# Patient Record
Sex: MALE | Race: WHITE | NOT HISPANIC OR LATINO | URBAN - METROPOLITAN AREA
[De-identification: names, ages, dates, MRNs, and addresses within clinical notes are randomized per-mention and may not be internally consistent; named-entity substitution may affect disease eponyms.]

---

## 2018-01-17 ENCOUNTER — OFFICE VISIT (OUTPATIENT)
Dept: FAMILY MEDICINE | Facility: CLINIC | Age: 28
End: 2018-01-17
Payer: COMMERCIAL

## 2018-01-17 VITALS
BODY MASS INDEX: 25.89 KG/M2 | TEMPERATURE: 98.2 F | SYSTOLIC BLOOD PRESSURE: 120 MMHG | HEIGHT: 69 IN | WEIGHT: 174.8 LBS | DIASTOLIC BLOOD PRESSURE: 72 MMHG | HEART RATE: 84 BPM | OXYGEN SATURATION: 98 %

## 2018-01-17 DIAGNOSIS — Z00.00 ROUTINE GENERAL MEDICAL EXAMINATION AT A HEALTH CARE FACILITY: Primary | ICD-10-CM

## 2018-01-17 DIAGNOSIS — R19.7 DIARRHEA, UNSPECIFIED TYPE: ICD-10-CM

## 2018-01-17 DIAGNOSIS — Z72.51 UNPROTECTED SEXUAL INTERCOURSE: ICD-10-CM

## 2018-01-17 DIAGNOSIS — Z00.01 ENCOUNTER FOR ROUTINE ADULT MEDICAL EXAM WITH ABNORMAL FINDINGS: ICD-10-CM

## 2018-01-17 DIAGNOSIS — F43.23 ADJUSTMENT DISORDER WITH MIXED ANXIETY AND DEPRESSED MOOD: ICD-10-CM

## 2018-01-17 DIAGNOSIS — F14.10 COCAINE ABUSE (H): ICD-10-CM

## 2018-01-17 DIAGNOSIS — Z11.3 SCREEN FOR STD (SEXUALLY TRANSMITTED DISEASE): ICD-10-CM

## 2018-01-17 LAB
ALBUMIN SERPL-MCNC: 3.9 G/DL (ref 3.4–5)
ALP SERPL-CCNC: 59 U/L (ref 40–150)
ALT SERPL W P-5'-P-CCNC: 26 U/L (ref 0–70)
ANION GAP SERPL CALCULATED.3IONS-SCNC: 6 MMOL/L (ref 3–14)
AST SERPL W P-5'-P-CCNC: 25 U/L (ref 0–45)
BILIRUB SERPL-MCNC: 0.4 MG/DL (ref 0.2–1.3)
BUN SERPL-MCNC: 8 MG/DL (ref 7–30)
CALCIUM SERPL-MCNC: 8.8 MG/DL (ref 8.5–10.1)
CHLORIDE SERPL-SCNC: 106 MMOL/L (ref 94–109)
CO2 SERPL-SCNC: 28 MMOL/L (ref 20–32)
CREAT SERPL-MCNC: 1.08 MG/DL (ref 0.66–1.25)
GFR SERPL CREATININE-BSD FRML MDRD: 83 ML/MIN/1.7M2
GLUCOSE SERPL-MCNC: 76 MG/DL (ref 70–99)
HIV 1+2 AB+HIV1 P24 AG SERPL QL IA: NONREACTIVE
POTASSIUM SERPL-SCNC: 3.7 MMOL/L (ref 3.4–5.3)
PROT SERPL-MCNC: 7.3 G/DL (ref 6.8–8.8)
SODIUM SERPL-SCNC: 140 MMOL/L (ref 133–144)
TSH SERPL DL<=0.005 MIU/L-ACNC: 3.73 MU/L (ref 0.4–4)

## 2018-01-17 PROCEDURE — 86780 TREPONEMA PALLIDUM: CPT | Performed by: NURSE PRACTITIONER

## 2018-01-17 PROCEDURE — 87491 CHLMYD TRACH DNA AMP PROBE: CPT | Performed by: NURSE PRACTITIONER

## 2018-01-17 PROCEDURE — 87591 N.GONORRHOEAE DNA AMP PROB: CPT | Performed by: NURSE PRACTITIONER

## 2018-01-17 PROCEDURE — 80053 COMPREHEN METABOLIC PANEL: CPT | Performed by: NURSE PRACTITIONER

## 2018-01-17 PROCEDURE — 87389 HIV-1 AG W/HIV-1&-2 AB AG IA: CPT | Performed by: NURSE PRACTITIONER

## 2018-01-17 PROCEDURE — 84443 ASSAY THYROID STIM HORMONE: CPT | Performed by: NURSE PRACTITIONER

## 2018-01-17 PROCEDURE — 99385 PREV VISIT NEW AGE 18-39: CPT | Performed by: NURSE PRACTITIONER

## 2018-01-17 PROCEDURE — 99213 OFFICE O/P EST LOW 20 MIN: CPT | Mod: 25 | Performed by: NURSE PRACTITIONER

## 2018-01-17 PROCEDURE — 36415 COLL VENOUS BLD VENIPUNCTURE: CPT | Performed by: NURSE PRACTITIONER

## 2018-01-17 RX ORDER — CRANBERRY FRUIT EXTRACT 200 MG
CAPSULE ORAL
COMMUNITY
Start: 2018-01-17 | End: 2018-10-08

## 2018-01-17 RX ORDER — GABAPENTIN 100 MG/1
100 CAPSULE ORAL 2 TIMES DAILY
COMMUNITY
End: 2018-10-08

## 2018-01-17 RX ORDER — BUPROPION HYDROCHLORIDE 150 MG/1
450 TABLET, EXTENDED RELEASE ORAL DAILY
COMMUNITY
End: 2021-01-20

## 2018-01-17 NOTE — MR AVS SNAPSHOT
After Visit Summary   1/17/2018    Bib Cates    MRN: [id number]           Patient Information     Date Of Birth          1990        Visit Information        Provider Department      1/17/2018 10:20 AM Areli Ulrich APRN [facility]        Today's Diagnoses     Screen for STD (sexually transmitted disease)    -  1    Routine general medical examination at a health care facility        Encounter for routine adult medical exam with abnormal findings        Cocaine abuse        Unprotected sexual intercourse        Adjustment disorder with mixed anxiety and depressed mood        Diarrhea, unspecified type          Care Instructions    The lab results will take a couple days to get back-for sure by next week I will send them to you in [employer] or a nurse will call.     Let us know at any time if you want more help getting sober.     Preventive Health Recommendations  Male Ages 26 - 39    Yearly exam:             See your health care provider every year in order to  o   Review health changes.   o   Discuss preventive care.    o   Review your medicines if your doctor has prescribed any.    You should be tested each year for STDs (sexually transmitted diseases), if you re at risk.     After age 35, talk to your provider about cholesterol testing. If you are at risk for heart disease, have your cholesterol tested at least every 5 years.     If you are at risk for diabetes, you should have a diabetes test (fasting glucose).  Shots: Get a flu shot each year. Get a tetanus shot every 10 years.     Nutrition:    Eat at least 5 servings of fruits and vegetables daily.     Eat whole-grain bread, whole-wheat pasta and brown rice instead of white grains and rice.     Talk to your provider about Calcium and Vitamin D.     Lifestyle    Exercise for at least 150 minutes a week (30 minutes a day, 5 days a week). This will help you control your weight and prevent disease.     Limit  "alcohol to one drink per day.     No smoking.     Wear sunscreen to prevent skin cancer.     See your dentist every six months for an exam and cleaning.             Follow-ups after your visit        Who to contact     If you have questions or need follow up information about today's clinic visit or your schedule please contact [facility] directly at [phone].  Normal or non-critical lab and imaging results will be communicated to you by MyChart, letter or phone within 4 business days after the clinic has received the results. If you do not hear from us within 7 days, please contact the clinic through nvitehart or phone. If you have a critical or abnormal lab result, we will notify you by phone as soon as possible.  Submit refill requests through [employer] or call your pharmacy and they will forward the refill request to us. Please allow 3 business days for your refill to be completed.          Additional Information About Your Visit        [employer] Information     [employer] lets you send messages to your doctor, view your test results, renew your prescriptions, schedule appointments and more. To sign up, go to [email or web address] . Click on \"Log in\" on the left side of the screen, which will take you to the Welcome page. Then click on \"Sign up Now\" on the right side of the page.     You will be asked to enter the access code listed below, as well as some personal information. Please follow the directions to create your username and password.     Your access code is: [email or web address]  Expires: 2018 10:36 AM     Your access code will  in 90 days. If you need help or a new code, please call your [facility] [place] [facility] or [phone].        Care EveryWhere ID     This is your Care EveryWhere ID. This could be used by other organizations to access your [place] medical records  [id number]        Your Vitals Were     Pulse Temperature Height Pulse Oximetry BMI (Body Mass Index)       84 98.2  F (36.8 " " C) (Oral) 5' 8.5\" (1.74 m) 98% 26.19 kg/m2        Blood Pressure from Last 3 Encounters:   01/17/18 120/72    Weight from Last 3 Encounters:   01/17/18 174 lb 12.8 oz (79.3 kg)              We Performed the Following     Anti Treponema     CHLAMYDIA TRACHOMATIS PCR     Comprehensive metabolic panel     HIV Antigen Antibody Combo     NEISSERIA GONORRHOEA PCR     TSH with free T4 reflex        Primary Care Provider Fax #    Physician No Ref-Primary [phone]       No address on file        Equal Access to Services     CARMEN HARRINGTON : Hadii aad ku hadasho Soomaali, waaxda luqadaha, qaybta kaalmada adeegyatristen, tamika philip . So [facility] [phone].    ATENCIÓN: Si habla español, tiene a gregory disposición servicios gratuitos de asistencia lingüística. Llame al [phone].    We comply with applicable federal civil rights laws and [place] laws. We do not discriminate on the basis of race, color, national origin, age, disability, sex, sexual orientation, or gender identity.            Thank you!     Thank you for choosing [facility]  for your care. Our goal is always to provide you with excellent care. Hearing back from our patients is one way we can continue to improve our services. Please take a few minutes to complete the written survey that you may receive in the mail after your visit with us. Thank you!             Your Updated Medication List - Protect others around you: Learn how to safely use, store and throw away your medicines at www.disposemymeds.org.          This list is accurate as of: 1/17/18 10:36 AM.  Always use your most recent med list.                   Brand Name Dispense Instructions for use Diagnosis    ACIDOPHILUS PROBIOTIC BLEND Caps           buPROPion 150 MG 12 hr tablet    WELLBUTRIN SR     Take 150 mg by mouth 2 times daily        cholecalciferol 5000 UNITS Caps capsule    vitamin D3     Take by mouth daily        gabapentin 100 MG capsule    " NEURONTIN     Take 100 mg by mouth 2 times daily        psyllium 0.52 G capsule     540 capsule    Take 1 capsule (0.52 g) by mouth daily
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
## 2018-01-17 NOTE — PATIENT INSTRUCTIONS
The lab results will take a couple days to get back-for sure by next week I will send them to you in [employer] or a nurse will call.     Let us know at any time if you want more help getting sober.     Preventive Health Recommendations  Male Ages 26 - 39    Yearly exam:             See your health care provider every year in order to  o   Review health changes.   o   Discuss preventive care.    o   Review your medicines if your doctor has prescribed any.    You should be tested each year for STDs (sexually transmitted diseases), if you re at risk.     After age 35, talk to your provider about cholesterol testing. If you are at risk for heart disease, have your cholesterol tested at least every 5 years.     If you are at risk for diabetes, you should have a diabetes test (fasting glucose).  Shots: Get a flu shot each year. Get a tetanus shot every 10 years.     Nutrition:    Eat at least 5 servings of fruits and vegetables daily.     Eat whole-grain bread, whole-wheat pasta and brown rice instead of white grains and rice.     Talk to your provider about Calcium and Vitamin D.     Lifestyle    Exercise for at least 150 minutes a week (30 minutes a day, 5 days a week). This will help you control your weight and prevent disease.     Limit alcohol to one drink per day.     No smoking.     Wear sunscreen to prevent skin cancer.     See your dentist every six months for an exam and cleaning.

## 2018-01-17 NOTE — NURSING NOTE
"Chief Complaint   Patient presents with     Establish Care     Physical     STD       Initial /72 (BP Location: Right arm, Patient Position: Chair, Cuff Size: Adult Regular)  Pulse 84  Temp 98.2  F (36.8  C) (Oral)  Ht 5' 8.5\" (1.74 m)  Wt 174 lb 12.8 oz (79.3 kg)  SpO2 98%  BMI 26.19 kg/m2 Estimated body mass index is 26.19 kg/(m^2) as calculated from the following:    Height as of this encounter: 5' 8.5\" (1.74 m).    Weight as of this encounter: 174 lb 12.8 oz (79.3 kg).  Medication Reconciliation: complete     Shira Infante CMA    "

## 2018-01-17 NOTE — LETTER
January 22, 2018      Bib Alberton  [address] [facility] [address]        Dear ,    We are writing to inform you of your test results.    Your test results fall within the expected range(s) or remain unchanged from previous results.  Please continue with current treatment plan.    Resulted Orders   Comprehensive metabolic panel   Result Value Ref Range    Sodium 140 133 - 144 mmol/L    Potassium 3.7 3.4 - 5.3 mmol/L    Chloride 106 94 - 109 mmol/L    Carbon Dioxide 28 20 - 32 mmol/L    Anion Gap 6 3 - 14 mmol/L    Glucose 76 70 - 99 mg/dL    Urea Nitrogen 8 7 - 30 mg/dL    Creatinine 1.08 0.66 - 1.25 mg/dL    GFR Estimate 83 >60 mL/min/1.7m2      Comment:      Non  GFR Calc    GFR Estimate If Black >90 >60 mL/min/1.7m2      Comment:       GFR Calc    Calcium 8.8 8.5 - 10.1 mg/dL    Bilirubin Total 0.4 0.2 - 1.3 mg/dL    Albumin 3.9 3.4 - 5.0 g/dL    Protein Total 7.3 6.8 - 8.8 g/dL    Alkaline Phosphatase 59 40 - 150 U/L    ALT 26 0 - 70 U/L    AST 25 0 - 45 U/L   TSH with free T4 reflex   Result Value Ref Range    TSH 3.73 0.40 - 4.00 mU/L   Anti Treponema   Result Value Ref Range    Treponema pallidum Antibody Negative NEG^Negative   HIV Antigen Antibody Combo   Result Value Ref Range    HIV Antigen Antibody Combo Nonreactive NR^Nonreactive          Comment:      HIV-1 p24 Ag & HIV-1/HIV-2 Ab Not Detected       If you have any questions or concerns, please call the clinic at the number listed above.       Sincerely,        CASI Constantino CNP

## 2018-01-18 LAB
C TRACH DNA SPEC QL NAA+PROBE: NEGATIVE
N GONORRHOEA DNA SPEC QL NAA+PROBE: NEGATIVE
SPECIMEN SOURCE: NORMAL
SPECIMEN SOURCE: NORMAL
T PALLIDUM IGG+IGM SER QL: NEGATIVE

## 2018-10-08 ENCOUNTER — OFFICE VISIT (OUTPATIENT)
Dept: FAMILY MEDICINE | Facility: CLINIC | Age: 28
End: 2018-10-08
Payer: COMMERCIAL

## 2018-10-08 VITALS
TEMPERATURE: 98.1 F | SYSTOLIC BLOOD PRESSURE: 112 MMHG | HEART RATE: 71 BPM | OXYGEN SATURATION: 100 % | DIASTOLIC BLOOD PRESSURE: 78 MMHG

## 2018-10-08 DIAGNOSIS — Z13.220 LIPID SCREENING: ICD-10-CM

## 2018-10-08 DIAGNOSIS — Z23 NEED FOR PROPHYLACTIC VACCINATION AND INOCULATION AGAINST INFLUENZA: ICD-10-CM

## 2018-10-08 DIAGNOSIS — Z13.1 SCREENING FOR DIABETES MELLITUS: ICD-10-CM

## 2018-10-08 DIAGNOSIS — Z11.3 SCREEN FOR STD (SEXUALLY TRANSMITTED DISEASE): Primary | ICD-10-CM

## 2018-10-08 DIAGNOSIS — Z23 NEED FOR INFLUENZA VACCINATION: ICD-10-CM

## 2018-10-08 DIAGNOSIS — G47.8 WAKES UP DURING NIGHT: ICD-10-CM

## 2018-10-08 DIAGNOSIS — R06.83 SNORING: ICD-10-CM

## 2018-10-08 LAB
CHOLEST SERPL-MCNC: 172 MG/DL
GLUCOSE SERPL-MCNC: 81 MG/DL (ref 70–99)
HDLC SERPL-MCNC: 55 MG/DL
LDLC SERPL CALC-MCNC: 103 MG/DL
NONHDLC SERPL-MCNC: 117 MG/DL
TRIGL SERPL-MCNC: 72 MG/DL

## 2018-10-08 PROCEDURE — 86780 TREPONEMA PALLIDUM: CPT | Performed by: NURSE PRACTITIONER

## 2018-10-08 PROCEDURE — 36415 COLL VENOUS BLD VENIPUNCTURE: CPT | Performed by: NURSE PRACTITIONER

## 2018-10-08 PROCEDURE — 87389 HIV-1 AG W/HIV-1&-2 AB AG IA: CPT | Performed by: NURSE PRACTITIONER

## 2018-10-08 PROCEDURE — 90471 IMMUNIZATION ADMIN: CPT | Performed by: NURSE PRACTITIONER

## 2018-10-08 PROCEDURE — 99213 OFFICE O/P EST LOW 20 MIN: CPT | Mod: 25 | Performed by: NURSE PRACTITIONER

## 2018-10-08 PROCEDURE — 87591 N.GONORRHOEAE DNA AMP PROB: CPT | Performed by: NURSE PRACTITIONER

## 2018-10-08 PROCEDURE — 90686 IIV4 VACC NO PRSV 0.5 ML IM: CPT | Performed by: NURSE PRACTITIONER

## 2018-10-08 PROCEDURE — 87491 CHLMYD TRACH DNA AMP PROBE: CPT | Performed by: NURSE PRACTITIONER

## 2018-10-08 PROCEDURE — 80061 LIPID PANEL: CPT | Performed by: NURSE PRACTITIONER

## 2018-10-08 PROCEDURE — 82947 ASSAY GLUCOSE BLOOD QUANT: CPT | Performed by: NURSE PRACTITIONER

## 2018-10-08 RX ORDER — TRAZODONE HYDROCHLORIDE 50 MG/1
50 TABLET, FILM COATED ORAL AT BEDTIME
Refills: 2 | COMMUNITY
Start: 2018-09-25 | End: 2019-07-17

## 2018-10-08 NOTE — PROGRESS NOTES
"  SUBJECTIVE:   Bib Eugene is a 27 year old male who presents to clinic today for the following health issues:    Would like STD screening, new recent partner.     Also referral for a sleep study.  Waking up frequently and snoring, his therapist wants him to get antoerh one  Reports 6 years ago had one at [facility] and didn't show anything  recently started trazodone and so far not helping with this  Doesn't wake up worrying but does have anxiety and depression, past substance abuse and followed by outside Psych [facility], takes buproprion  Taking vit D  No asthma or breathing issues, no new symptoms       Name:  BIB EUGENE  Study Date: 2/27/2014  YOB: 1990    Polysomnography Report    Indications for Polysomnogram:  The patient is a 23 year old male who stands 5'9\", weighs 179 lbs., and has a corresponding BMI equal to 26.4.  He has   an Hammond sleepiness scale value of  16 .  Neck circumference was measured at 18 inches.   A full nightpolysomnogram (PSG) was performed to evaluate   for Obstructive Sleep Apnea (MELLY) in this patient who has poor quality non-restorative sleep with subsequent daytime sleepiness.   He has a past   medical history which includes:  2+  tonsils.  Snore andchoking arousals.  According to the patient,  he may be having his tonsils removed.    Referring Physician: Saira Joseph RN  CNP    Medications: None at this time    Polysomnogram Data:  A full night attended polysomnogram recorded the standard physiologic parameters including EEG, EOG, EMG, EKG, nasal and oral   airflow.  Respiratory parameters of chest and abdominal movements were recorded with respiratory inductanceplethysmography.  Oxygen saturation was   recorded by pulse oximetry.    Sleep Architecture:  Sleep recording started at 23:19:21 and the recording ended at 07:44:52.  The total recording time of the polysomnogram was 505.5   minutes.  The total sleep time was 489.0 " minutes on the diagnostic portion of the study.  The overalltotal sleep time was 489.0 minutes.  Sleep   latency was normal at 0.0 minutes.  REM latency was normal at 136.5 minutes.  There were 117 arousals on the diagnostic portion of the study with an   arousal index of 13.6.  Sleep efficiency was normal at 96.7 %.Wake after sleep onset was 16.5.  The patient spent 9.1% of total sleep time in Stage   N1, 48.2% in Stage N2, 20.0% in Stage N3, and 22.7% in REM.       Respiration:  -Snoring was reported as present and noted to be intermittently loud in intensity.  -Sleep Associated Hypoxemia was not present and based upon cumulative exposure of SaO2 below 88% for more than 5 minutes of total sleep time during   the diagnostic portion of the PSG.  Baseline oxygen saturation during wake was 96%.  Lowest oxygensaturation during sleep was 89%.    -Sustained Sleep Associated Hypoventilation was not  assessed with Transcutaneous Monitoring (TCM) of CO2 during this PSG.    -Events  obstructive, 0 central, and 0 mixed apneas resulting in an Apnea index of 0.0 events per hour.  There were 23 hypopneas resulting in a   Hypopnea index of 2.8 events per hour.  The combined Apnea/Hypopnea -Index was 2.8 events per hour.  The REMAHI is 1.1.  The supine AHI is 4.7.  The   RERA index was 6.1 per hour.  The RDI was 9.0.    Movement Activity:  -Limb Notes ecorded.  The PLM index was 0.0 per hour.   -Behavior with normal muscle atonia.  -Bruxism     Cardiac Summary:  The average pulse rate was 63 bpm.  The minimal pulse rate was 52 bpm while the maximum pulse rate was 73 bpm.  The following   dysrhythmias were noted:  None       Home Study: Actigraphy / CPAP Download   There are no home diagnostic studies that are associated with this in-laboratory PSG.    Treatment:    Given that the patient did not exhibit clinically significant MELLY, therapeutic intervention with Positive Airway Pressure in a mode such as CPAP was   not  initiated.    Assessment  -Normal sleep architecture  -Mild sleep apnea, Upper Airway Resistance Syndrome  -Sleep-related hypoxemia and/or hypoventilation (>5 min SpO2 < 88%)  was not present    Recommendations:  -Advise regarding the risks of drowsy driving   -Suggest optimizing sleep hygiene and avoiding sleep deprivation  -Mandibular advancement device could be considered in this setting with a referral to a Sleep Dental Specialist.  Note: A Mandibular advancement   device may not be covered by all primary medical insurance plans.  In this situation, the patient shouldcheck with their insurance provider to   determine the extent of coverage.  -[facility] Staff will contact the patient within 10 days upon the completion of the PSG to discuss the results of the study and to provide   appropriate therapeutic options.      Diagnosis Code(s): 327.23  Obstructive Sleep Apnea      ________________________________  Electronically Signed By:    Dr. Arlene Cohen MD  (NPI#: [id number])  Diplomate of Sleep Medicine, ABPN        Problem list and histories reviewed & adjusted, as indicated.  Additional history: as documented    Patient Active Problem List   Diagnosis     Diarrhea, unspecified type     Adjustment disorder with mixed anxiety and depressed mood     Unprotected sexual intercourse     Cocaine abuse (H)     Past Surgical History:   Procedure Laterality Date     HC TOOTH EXTRACTION W/FORCEP  12/28/2010    [place] teeth       Social History   Substance Use Topics     Smoking status: Never Smoker     Smokeless tobacco: Never Used     Alcohol use Yes      Comment: 5/week     Family History   Problem Relation Age of Onset     Substance Abuse Mother      Anxiety Disorder Mother      Depression Mother      Mental Illness Mother      Depression Father      Anxiety Disorder Father          Current Outpatient Prescriptions   Medication Sig Dispense Refill     buPROPion (WELLBUTRIN SR) 150 MG 12 hr tablet Take 150 mg by mouth  2 times daily       cholecalciferol (VITAMIN D3) 5000 UNITS CAPS capsule Take by mouth daily       psyllium 0.52 G capsule Take 1 capsule (0.52 g) by mouth daily 540 capsule      traZODone (DESYREL) 50 MG tablet Take 50 mg by mouth At Bedtime  2     No Known Allergies  Recent Labs   Lab Test  01/17/18   1039   ALT  26   CR  1.08   GFRESTIMATED  83   GFRESTBLACK  >90   POTASSIUM  3.7   TSH  3.73      BP Readings from Last 3 Encounters:   10/08/18 112/78   01/17/18 120/72    Wt Readings from Last 3 Encounters:   01/17/18 174 lb 12.8 oz (79.3 kg)                  Labs reviewed in EPIC    Reviewed and updated as needed this visit by clinical staff  Tobacco  Allergies  Meds  Med Hx  Surg Hx  Fam Hx  Soc Hx      Reviewed and updated as needed this visit by Provider         ROS:  Constitutional, HEENT, cardiovascular, pulmonary, GI, , musculoskeletal, neuro, skin, endocrine and psych systems are negative, except as otherwise noted.    OBJECTIVE:     /78 (BP Location: Right arm, Patient Position: Sitting, Cuff Size: Adult Regular)  Pulse 71  Temp 98.1  F (36.7  C) (Temporal)  SpO2 100%  There is no height or weight on file to calculate BMI.  GENERAL: healthy, alert and no distress  RESP: lungs clear to auscultation - no rales, rhonchi or wheezes  CV: regular rate and rhythm, normal S1 S2, no S3 or S4, no murmur, click or rub, no peripheral edema and peripheral pulses strong  MS: no gross musculoskeletal defects noted, no edema  SKIN: no suspicious lesions or rashes  NEURO: Normal strength and tone, mentation intact and speech normal  PSYCH: MENTAL STATUS EXAM  Appearance: appropriate  Attitude: cooperative  Behavior: normal  Eye Contact: normal  Speech: normal  Orientation: oriented to person , place, time and situation  Mood: states his mood has been stable   Affect: Normal/bright, anxious and pleasant   Thought Process: clear     Diagnostic Test Results:  No results found for this or any previous visit  (from the past 24 hour(s)).    ASSESSMENT/PLAN:         ICD-10-CM    1. Screen for STD (sexually transmitted disease) Z11.3 NEISSERIA GONORRHOEA PCR     CHLAMYDIA TRACHOMATIS PCR     HIV Antigen Antibody Combo     Treponema Abs w Reflex to RPR and Titer   2. Snoring R06.83 SLEEP EVALUATION & MANAGEMENT REFERRAL - [facility]  [phone] (Age 2 and up)   3. Wakes up during night G47.8 SLEEP EVALUATION & MANAGEMENT REFERRAL - [facility]  [phone] (Age 2 and up)   4. Lipid screening Z13.220 Lipid panel reflex to direct LDL Fasting   5. Screening for diabetes mellitus Z13.1 Glucose   6. Need for influenza vaccination Z23    7. Need for prophylactic vaccination and inoculation against influenza Z23 FLU VACCINE, SPLIT VIRUS, IM (QUADRIVALENT) [15434]- >3 YRS     Will repeat sleep study. Noted after this visit pt had previous substance abuse and didn't ask if currently using, will address next visit and continue with psych for depression and anxiety, continue counseling regularly and discussed good self care.     You will get a phone call if any results are abnormal. Otherwise, My Chart in a couple days.  remember to use condoms 100% of the time     CASI Constantino CNP  [facility]

## 2018-10-08 NOTE — MR AVS SNAPSHOT
After Visit Summary   10/8/2018    Bib Cates    MRN: [id number]           Patient Information     Date Of Birth          1990        Visit Information        Provider Department      10/8/2018 10:00 AM Areli Ulrich APRN CNP [facility]        Today's Diagnoses     Screen for STD (sexually transmitted disease)    -  1    Lipid screening        Screening for diabetes mellitus        Need for influenza vaccination        Snoring        Wakes up during night           Follow-ups after your visit        Additional Services     SLEEP EVALUATION & MANAGEMENT REFERRAL - [facility]  [phone] (Age 2 and up)       Please be aware that coverage of these services is subject to the terms and limitations of your health insurance plan.  Call member services at your health plan with any benefit or coverage questions.      Please bring the following to your appointment:    >>   List of current medications   >>   This referral request   >>   Any documents/labs given to you for this referral                      Future tests that were ordered for you today     Open Future Orders        Priority Expected Expires Ordered    SLEEP EVALUATION & MANAGEMENT REFERRAL - [facility]  [phone] (Age 2 and up) Routine  10/8/2019 10/8/2018            Who to contact     If you have questions or need follow up information about today's clinic visit or your schedule please contact [facility] directly at [phone].  Normal or non-critical lab and imaging results will be communicated to you by MyChart, letter or phone within 4 business days after the clinic has received the results. If you do not hear from us within 7 days, please contact the clinic through MyChart or phone. If you have a critical or abnormal lab result, we will notify you by phone as soon as  possible.  Submit refill requests through "[email or web address]" or call your pharmacy and they will forward the refill request to us. Please allow 3 business days for your refill to be completed.          Additional Information About Your Visit        [employer] Information     "[email or web address]" gives you secure access to your electronic health record. If you see a primary care provider, you can also send messages to your care team and make appointments. If you have questions, please call your primary care clinic.  If you do not have a primary care provider, please call [phone] and they will assist you.        Care EveryWhere ID     This is your Care EveryWhere ID. This could be used by other organizations to access your [place] medical records  [id number]        Your Vitals Were     Pulse Temperature Pulse Oximetry             71 98.1  F (36.7  C) (Temporal) 100%          Blood Pressure from Last 3 Encounters:   10/08/18 112/78   01/17/18 120/72    Weight from Last 3 Encounters:   01/17/18 174 lb 12.8 oz (79.3 kg)              We Performed the Following     CHLAMYDIA TRACHOMATIS PCR     Glucose     HIV Antigen Antibody Combo     Lipid panel reflex to direct LDL Fasting     NEISSERIA GONORRHOEA PCR     Treponema Abs w Reflex to RPR and Titer        Primary Care Provider Office Phone # Fax #    Areli CASI Bob [phone]       [address] [place] [address] 700  [facility] [address]        Equal Access to Services     [facility] HospitalHAZEL : Hadii aad ku hadasho Soomaali, waaxda luqadaha, qaybta kaalmada adeegyada, waxay nikita haykevin philip . So [facility] [phone].    ATENCIÓN: Si habla español, tiene a gregory disposición servicios gratuitos de asistencia lingüística. Llame al [phone].    We comply with applicable federal civil rights laws and [place] laws. We do not discriminate on the basis of race, color, national origin, age, disability, sex, sexual orientation, or gender identity.            Thank you!      Thank you for choosing [facility]  for your care. Our goal is always to provide you with excellent care. Hearing back from our patients is one way we can continue to improve our services. Please take a few minutes to complete the written survey that you may receive in the mail after your visit with us. Thank you!             Your Updated Medication List - Protect others around you: Learn how to safely use, store and throw away your medicines at www.disposemymeds.org.          This list is accurate as of 10/8/18 10:09 AM.  Always use your most recent med list.                   Brand Name Dispense Instructions for use Diagnosis    buPROPion 150 MG 12 hr tablet    WELLBUTRIN SR     Take 150 mg by mouth 2 times daily        cholecalciferol 5000 units Caps capsule    vitamin D3     Take by mouth daily        psyllium 0.52 g capsule     540 capsule    Take 1 capsule (0.52 g) by mouth daily        traZODone 50 MG tablet    DESYREL     Take 50 mg by mouth At Bedtime

## 2018-10-08 NOTE — PROGRESS NOTES

## 2018-10-09 LAB
C TRACH DNA SPEC QL NAA+PROBE: NEGATIVE
HIV 1+2 AB+HIV1 P24 AG SERPL QL IA: NONREACTIVE
N GONORRHOEA DNA SPEC QL NAA+PROBE: NEGATIVE
SPECIMEN SOURCE: NORMAL
SPECIMEN SOURCE: NORMAL
T PALLIDUM AB SER QL: NONREACTIVE

## 2019-07-17 ENCOUNTER — OFFICE VISIT (OUTPATIENT)
Dept: FAMILY MEDICINE | Facility: CLINIC | Age: 29
End: 2019-07-17
Payer: COMMERCIAL

## 2019-07-17 VITALS
HEART RATE: 90 BPM | OXYGEN SATURATION: 99 % | BODY MASS INDEX: 27.14 KG/M2 | TEMPERATURE: 98.9 F | DIASTOLIC BLOOD PRESSURE: 75 MMHG | WEIGHT: 179.1 LBS | HEIGHT: 68 IN | RESPIRATION RATE: 18 BRPM | SYSTOLIC BLOOD PRESSURE: 125 MMHG

## 2019-07-17 DIAGNOSIS — G47.00 INSOMNIA, UNSPECIFIED TYPE: ICD-10-CM

## 2019-07-17 DIAGNOSIS — F14.10 COCAINE USE DISORDER (H): ICD-10-CM

## 2019-07-17 DIAGNOSIS — F43.23 ADJUSTMENT DISORDER WITH MIXED ANXIETY AND DEPRESSED MOOD: ICD-10-CM

## 2019-07-17 DIAGNOSIS — Z00.00 ROUTINE GENERAL MEDICAL EXAMINATION AT A HEALTH CARE FACILITY: Primary | ICD-10-CM

## 2019-07-17 PROCEDURE — 99395 PREV VISIT EST AGE 18-39: CPT | Performed by: NURSE PRACTITIONER

## 2019-07-17 PROCEDURE — 99213 OFFICE O/P EST LOW 20 MIN: CPT | Mod: 25 | Performed by: NURSE PRACTITIONER

## 2019-07-17 RX ORDER — PROPRANOLOL HYDROCHLORIDE 20 MG/1
20 TABLET ORAL 2 TIMES DAILY
Qty: 180 TABLET | Refills: 3 | Status: SHIPPED | OUTPATIENT
Start: 2019-07-17 | End: 2021-01-20

## 2019-07-17 RX ORDER — PROPRANOLOL HYDROCHLORIDE 20 MG/1
20 TABLET ORAL
COMMUNITY
Start: 2019-03-13 | End: 2019-07-17

## 2019-07-17 RX ORDER — HYDROXYZINE HYDROCHLORIDE 25 MG/1
25 TABLET, FILM COATED ORAL 3 TIMES DAILY PRN
Qty: 90 TABLET | Refills: 3 | Status: SHIPPED | OUTPATIENT
Start: 2019-07-17 | End: 2021-01-20 | Stop reason: DRUGHIGH

## 2019-07-17 ASSESSMENT — ANXIETY QUESTIONNAIRES
IF YOU CHECKED OFF ANY PROBLEMS ON THIS QUESTIONNAIRE, HOW DIFFICULT HAVE THESE PROBLEMS MADE IT FOR YOU TO DO YOUR WORK, TAKE CARE OF THINGS AT HOME, OR GET ALONG WITH OTHER PEOPLE: SOMEWHAT DIFFICULT
6. BECOMING EASILY ANNOYED OR IRRITABLE: NOT AT ALL
1. FEELING NERVOUS, ANXIOUS, OR ON EDGE: SEVERAL DAYS
7. FEELING AFRAID AS IF SOMETHING AWFUL MIGHT HAPPEN: NOT AT ALL
5. BEING SO RESTLESS THAT IT IS HARD TO SIT STILL: NOT AT ALL
2. NOT BEING ABLE TO STOP OR CONTROL WORRYING: SEVERAL DAYS
GAD7 TOTAL SCORE: 4
3. WORRYING TOO MUCH ABOUT DIFFERENT THINGS: SEVERAL DAYS

## 2019-07-17 ASSESSMENT — PATIENT HEALTH QUESTIONNAIRE - PHQ9
5. POOR APPETITE OR OVEREATING: SEVERAL DAYS
SUM OF ALL RESPONSES TO PHQ QUESTIONS 1-9: 10

## 2019-07-17 ASSESSMENT — MIFFLIN-ST. JEOR: SCORE: 1761.89

## 2019-07-17 NOTE — PROGRESS NOTES
SUBJECTIVE:   CC: Bib Cates is an 28 year old male who presents for preventive health visit.     Healthy Habits:    Do you get at least three servings of calcium containing foods daily (dairy, green leafy vegetables, etc.)? yes    Amount of exercise or daily activities, outside of work: 5 day(s) per week    Problems taking medications regularly No    Medication side effects: No    Have you had an eye exam in the past two years? yes    Do you see a dentist twice per year? yes    Do you have sleep apnea, excessive snoring or daytime drowsiness?yes    Hard time staying asleep, used to take trazodone but made dreams more active  lunesta made dreams worse too  Melatonin wakes up in a fog even 2 mg  Hydroxyzine 50 mg woke up in fog  Snoring even on side, sleep study thinks at [facility] was told not apnea or narcolepsy and wanted to do more testing but it was too expensive for him    Cocaine use continues not daily like when he first saw me  Therapist for depression  And getting better. Last month hard life stresses in general    has been on propanolol twice a day and works well for anxiety, requesting refill of this   wellbutrin has from Psych  History of cutting and cravings to do at times      [facility] goes every 3 months  Still working at [email or web address], same partner for over a year now    Today's PHQ-2 Score:   PHQ-2 ( 1999 Pfizer) 1/17/2018   Q1: Little interest or pleasure in doing things 0   Q2: Feeling down, depressed or hopeless 0   PHQ-2 Score 0       Abuse: Current or Past(Physical, Sexual or Emotional)- No  Do you feel safe in your environment? Yes    Social History     Tobacco Use     Smoking status: Never Smoker     Smokeless tobacco: Never Used   Substance Use Topics     Alcohol use: Yes     Comment: 5/week     If you drink alcohol do you typically have >3 drinks per day or >7 drinks per week? No                      Last PSA: No results found for: PSA    Reviewed orders with patient.  Reviewed health maintenance and updated orders accordingly - Yes  Labs reviewed in EPIC  BP Readings from Last 3 Encounters:   07/17/19 125/75   10/08/18 112/78   01/17/18 120/72    Wt Readings from Last 3 Encounters:   07/17/19 81.2 kg (179 lb 1.6 oz)   01/17/18 79.3 kg (174 lb 12.8 oz)                  Patient Active Problem List   Diagnosis     Diarrhea, unspecified type     Adjustment disorder with mixed anxiety and depressed mood     Unprotected sexual intercourse     Cocaine abuse (H)     Past Surgical History:   Procedure Laterality Date     HC TOOTH EXTRACTION W/FORCEP  12/28/2010    [place] teeth       Social History     Tobacco Use     Smoking status: Never Smoker     Smokeless tobacco: Never Used   Substance Use Topics     Alcohol use: Yes     Comment: 5/week     Family History   Problem Relation Age of Onset     Substance Abuse Mother      Anxiety Disorder Mother      Depression Mother      Mental Illness Mother      Depression Father      Anxiety Disorder Father          Current Outpatient Medications   Medication Sig Dispense Refill     buPROPion (WELLBUTRIN SR) 150 MG 12 hr tablet Take 450 mg by mouth daily        cholecalciferol (VITAMIN D3) 5000 UNITS CAPS capsule Take by mouth daily       hydrOXYzine (ATARAX) 25 MG tablet Take 1 tablet (25 mg) by mouth 3 times daily as needed for anxiety or other (sleep) 90 tablet 3     propranolol (INDERAL) 20 MG tablet Take 1 tablet (20 mg) by mouth 2 times daily 180 tablet 3     psyllium 0.52 G capsule Take 1 capsule (0.52 g) by mouth daily 540 capsule      No Known Allergies    Reviewed and updated as needed this visit by clinical staff         Reviewed and updated as needed this visit by Provider        No past medical history on file.   Past Surgical History:   Procedure Laterality Date     HC TOOTH EXTRACTION W/FORCEP  12/28/2010    [place] teeth       ROS:  Constitutional, eye, ENT, skin, breast, respiratory, cardiac, GI, , MSK, neuro, psych, and allergy  "are normal except as otherwise noted.     OBJECTIVE:   /75   Pulse 90   Temp 98.9  F (37.2  C) (Oral)   Resp 18   Ht 1.727 m (5' 8\")   Wt 81.2 kg (179 lb 1.6 oz)   SpO2 99%   BMI 27.23 kg/m    EXAM:  GENERAL: healthy, alert and no distress  EYES: Eyes grossly normal to inspection, PERRL and conjunctivae and sclerae normal  HENT: ear canals and TM's normal, nose and mouth without ulcers or lesions  NECK: no adenopathy, no asymmetry, masses, or scars and thyroid normal to palpation  RESP: lungs clear to auscultation - no rales, rhonchi or wheezes  CV: regular rate and rhythm, normal S1 S2, no S3 or S4, no murmur, click or rub, no peripheral edema and peripheral pulses strong  ABDOMEN: soft, nontender, no hepatosplenomegaly, no masses and bowel sounds normal  MS: no gross musculoskeletal defects noted, no edema  SKIN: no suspicious lesions or rashes  NEURO: Normal strength and tone, mentation intact and speech normal  PSYCH: mentation appears normal, affect normal/bright    Diagnostic Test Results:  Labs reviewed in Epic    ASSESSMENT/PLAN:       ICD-10-CM    1. Routine general medical examination at a health care facility Z00.00    2. Insomnia, unspecified type G47.00 propranolol (INDERAL) 20 MG tablet     hydrOXYzine (ATARAX) 25 MG tablet     OFFICE/OUTPT VISIT,EST,LEVL III   3. Adjustment disorder with mixed anxiety and depressed mood F43.23 propranolol (INDERAL) 20 MG tablet     hydrOXYzine (ATARAX) 25 MG tablet     OFFICE/OUTPT VISIT,EST,LEVL III   4. Cocaine use disorder (H) F14.10 OFFICE/OUTPT VISIT,EST,LEVL III   exceptional health other than cocaine use and mental health, recommended cessation and continue with psych and counseling, he will call if needs info for treatment    Not clear if insomnia related to his use, sounds more persistent and will trial lower dose hydroxyzine, discuss with psych at his next appointment   Monitor if propanolol is cuasing any daytime tiredness   If needs referral " "for sleep medicine eval just call I'll order it     COUNSELING:      Estimated body mass index is 26.19 kg/m  as calculated from the following:    Height as of 1/17/18: 1.74 m (5' 8.5\").    Weight as of 1/17/18: 79.3 kg (174 lb 12.8 oz).    Weight management plan: Discussed healthy diet and exercise guidelines     reports that he has never smoked. He has never used smokeless tobacco.      Counseling Resources:  ATP IV Guidelines  Pooled Cohorts Equation Calculator  FRAX Risk Assessment  ICSI Preventive Guidelines  Dietary Guidelines for Americans, 2010  USDA's MyPlate  ASA Prophylaxis  Lung CA Screening    CASI Constantino CNP  [facility]  "

## 2019-07-18 ASSESSMENT — ANXIETY QUESTIONNAIRES: GAD7 TOTAL SCORE: 4

## 2019-08-23 ENCOUNTER — MYC MEDICAL ADVICE (OUTPATIENT)
Dept: FAMILY MEDICINE | Facility: CLINIC | Age: 29
End: 2019-08-23

## 2019-08-23 DIAGNOSIS — Z91.09 ENVIRONMENTAL ALLERGIES: Primary | ICD-10-CM

## 2019-08-23 NOTE — TELEPHONE ENCOUNTER
Routing to provider - Serg - please review and advise as appropriate    Patient request:  Allergy referral or provider input on if he should start with office visit     Called patient he does not have allergic reaction symptoms currently - NO SYMPTOMS AT THIS TIME - symptoms occurred x 1 month ago    Please see mychart photos    This is second reaction patient has had     1st reaction - happened outside of home - returned home went into shower and then reports 'I just layed there until it went away'    2nd reaction - noticed it was worst - reports facial swelling, and breathing problems        Triage Recommendations:  Journal about these situations - foods you ate, products, medications, new experiences, were you outside to discover patterns, - please be very cautious with this in the future as reactions appear to be getting sequentially worse each time - next reaction may be stronger, more severe, and come about quicker - for any facial swelling, throat, tongue swelling and breathing problems emergency department/911 is advised please      Patient verbalized understanding and agrees with plan

## 2019-08-26 NOTE — TELEPHONE ENCOUNTER
If having breathing concerns would need to be seen sooner (other than stuffy nose and eyes are usually more local reaction). Agree with plan for allergy specialist and testing, signed    Thanks  Areli LANCE CNP

## 2019-09-12 DIAGNOSIS — F43.23 ADJUSTMENT DISORDER WITH MIXED ANXIETY AND DEPRESSED MOOD: ICD-10-CM

## 2019-09-12 DIAGNOSIS — G47.00 INSOMNIA, UNSPECIFIED TYPE: ICD-10-CM

## 2019-09-12 NOTE — TELEPHONE ENCOUNTER
"Requested Prescriptions   Pending Prescriptions Disp Refills     hydrOXYzine (ATARAX) 25 MG tablet 90 tablet 3     Sig: Take 1 tablet (25 mg) by mouth 3 times daily as needed for anxiety or other (sleep)  Last Written Prescription Date:  07/17/2019  Last Fill Quantity: 90,  # refills: 3   Last office visit: 7/17/2019 with prescribing provider:  07/17/2019   Future Office Visit:         Antihistamines Protocol Passed - 9/12/2019  9:06 AM        Passed - Recent (12 mo) or future (30 days) visit within the authorizing provider's specialty     Patient had office visit in the last 12 months or has a visit in the next 30 days with authorizing provider or within the authorizing provider's specialty.  See \"Patient Info\" tab in inbasket, or \"Choose Columns\" in Meds & Orders section of the refill encounter.              Passed - Patient is age 3 or older     Apply age and/or weight-based dosing for peds patients age 3 and older.    Forward request to provider for patients under the age of 3.          Passed - Medication is active on med list        "

## 2019-09-13 RX ORDER — HYDROXYZINE HYDROCHLORIDE 25 MG/1
25 TABLET, FILM COATED ORAL 3 TIMES DAILY PRN
Qty: 90 TABLET | Refills: 3 | Status: CANCELLED | OUTPATIENT
Start: 2019-09-13

## 2019-09-13 NOTE — TELEPHONE ENCOUNTER
LVM for the pharmacy to confirm the receipt of 7/17/19 script which would nullify this request.  Dawna Perez RN September 13, 2019 8:31 AM

## 2019-09-13 NOTE — TELEPHONE ENCOUNTER
Duplicate refill request; confirmed with pharmacy.  Cancelled request and closed encounter. F/U prn. Dawna Perez RN September 13, 2019 4:11 PM

## 2019-09-19 NOTE — TELEPHONE ENCOUNTER
FUTURE VISIT INFORMATION      FUTURE VISIT INFORMATION:    Date: 9.20.19    Time: 10:30 AM    Location: [facility]  REFERRAL INFORMATION:    Referring provider:  CASI Anderson    Referring providers clinic:  [facility]    Reason for visit/diagnosis  Environmental allergies          Clinic name Comments Records Status Imaging Status   [facility] 8.26.19 Referral   7.17.19 [facility]

## 2019-09-20 ENCOUNTER — PRE VISIT (OUTPATIENT)
Dept: ALLERGY | Facility: CLINIC | Age: 29
End: 2019-09-20

## 2019-09-20 ENCOUNTER — OFFICE VISIT (OUTPATIENT)
Dept: ALLERGY | Facility: CLINIC | Age: 29
End: 2019-09-20
Attending: NURSE PRACTITIONER
Payer: COMMERCIAL

## 2019-09-20 DIAGNOSIS — T78.3XXD ANGIOEDEMA, SUBSEQUENT ENCOUNTER: Primary | ICD-10-CM

## 2019-09-20 RX ORDER — CAFFEINE 200 MG
200 TABLET ORAL
COMMUNITY
End: 2022-03-09

## 2019-09-20 RX ORDER — AMOXICILLIN 500 MG
1200 CAPSULE ORAL DAILY
COMMUNITY

## 2019-09-20 RX ORDER — CETIRIZINE HYDROCHLORIDE 10 MG/1
TABLET ORAL
Qty: 2 TABLET | Refills: 3 | Status: SHIPPED | OUTPATIENT
Start: 2019-09-20 | End: 2021-01-20

## 2019-09-20 RX ORDER — PREDNISONE 50 MG/1
TABLET ORAL
Qty: 2 TABLET | Refills: 3 | Status: SHIPPED | OUTPATIENT
Start: 2019-09-20 | End: 2022-03-09

## 2019-09-20 ASSESSMENT — PAIN SCALES - GENERAL: PAINLEVEL: NO PAIN (0)

## 2019-09-20 NOTE — PATIENT INSTRUCTIONS
topy Screen (Placed 09/20/19 )    No Substance Readings (15 min) Evaluation   POS Histamine 1mg/ml ++    NEG NaCl 0.9% -      No Substance Readings (15 min) Evaluation   1 Alternaria alternata (tenuis)  -    2 Cladosporium herbarum -    3 Aspergillus fumigatus -    4 Penicillium notatum -    5 Dermatophagoides pteronyssinus ++++    6 Dermatophagoides farinae ++++    7 Dog epithelium (canis spp) ++    8 Cat hair (yuli catus) +++    9 Cockroach   (Blatella americana & germanica) -    10 Grass mix midwest   ([place], Orchard, Redtop, Luca) +    11 Serg grass (sorghum halepense) -    12 Weed mix   (common Cocklebur, Lamb s quarters, rough redroot Pigweed, Dock/Sorrel) +/++    13 Mug wort (artemisia vulgare) ++    14 Ragweed giant/short (ambrosia spp) +++    15 English Plantain (plantago lanceolata) -    16 Tree mix 1 (Pecan, Maple BHR, Oak RVW, american [place], black [place]) ++++    17 Red cedar (juniperus virginia) +++    18 Tree mix 2   (white Gurdeep, river/red Birch, black [place], common [place], american Elm) ++++    19 Box elder/Maple mix (acer spp) ++++    20 [place] shagbark (carya ovata) ++++               Milk, Meat, Egg, and Grains (09/20/19 )    No Substance Readings (15min) Evaluation   11 Barley (hordeum vulgare) -    12 Buckwheat (fagopyrum esculentum) -    13 Corn (rosa mais) -    14 Hops (humulus lupulus)  -    15 Malt (hordeum vulgare)  -    16 Oat (kallie sativa) -    17 Rice (oryza sativa) -    18 Rye (secale cereale) -    19 Whole wheat (triticum aestivum) -      Conclusion: massive sensitizations to house dust mites and tree pollens and ragweed. Less to grass pollens and other weeds and dog/cat epithelia. No signs for allergy to wheat and similars.     Impression/Plan:    >> Acute Angioedema- in face without breathing problems --> maybe linked to severe sensitization to house dust mites and tree/ragweed pollens.  No medications involved  Atopic predisposition with pollen and house dust mite  allergy  No common food exposure.  If repeated angioedema the safety of propranolol with allergic reactions should be evaluated.    >> mild rhinoconjunctivitis more morning and seasonal aggravation with sensitization to HDM and pollens. However, clinical symptoms are less than expected from severity of allergy tests.    Therapy/Procedure    >> Emergency set: if again allergic reaction take immediately 100mg Prednisone and 2 Tabl Antihistamine (Cetirizine) and then write 12h retrospective diary. This is to carry with him at all times    >> discussed with patient Immunotherapy, but not sure because clinical manifestation for patient not bothersome.

## 2019-09-20 NOTE — NURSING NOTE
Chief Complaint   Patient presents with     Asthma Consult     Bib had some recent allergy reactions: throat was dry and scratchy end of July reports wheezing, eyes  during what he says was a very normal day. On both incidents,Bib took a benedryl and had a cool shower.      Gisela Mccoy LPN

## 2019-09-20 NOTE — PROGRESS NOTES
[facility] Note    Allergy Clinic  [facility]  [facility]  [address] [place] [id number]    Allergy Problem List:  1.Atopic allergies    Encounter Date: Sep 20, 2019    CC:  Chief Complaint   Patient presents with     Asthma Consult     Bib had some recent allergy reactions: throat was dry and scratchy end of July reports wheezing, eyes  during what he says was a very normal day. On both incidents,Bib took a benedryl and had a cool shower.        History of Present Illness:  Mr. Bib Cates (Cole) is a 28 year old male who presents in consultation for allergic reaction.  Jaziel had 2 episodes of some sort of allergic reaction this summer:  The worst was the most recent. At 1230 PM on Monday July 29 Jaziel had an allergic reaction. The only thing he had ingested that day was his pre-workout- caffeine, beta alanine, etc. He was beginning his work out using a foam roller when he noticed a reaction begin to occur. He noticed dry and scratchy throat through the first 5 min. His eyes then got dry took out contacts. His noose started to become extremely runny. There was some wheezing and trouble breathing on his walk home (10 min). He even had to stop at a restaurant because his nose was running so bad. His eyes started swelling he thinks while he was walking home- he has a picture 30 min after onset of symptoms. Right eye was extremely swollen and red and left was swollen and red but not as bad.    Once he got home had roommate call into work for him and took the photo. He took 1 benadryl and cold shower and waited for symptoms to calm. It took about 30 min for symptoms to beginning to improve. At 8 pm the swelling had significantly went down but was still swollen shut without really trying to open. In the morning he was very much improved, but there was still some swelling. Almost by noon the symptoms had cleared. His only thoughts on what  may have happened are that he possibly touched a pine tree on the way in.     First episode occurred around May around 8 pm on a weekday and had a normal day with no symptoms. He describes the episode as dry eyes, throat, and runny nose. At the time he was eating a peanut butter and jelly sandwich- but he is able to eat peanut butter frequently. He did not have a headache that day and did not take any medication besides his prescriptions. The only contribution he could think of at the time was increased dust from a new fan they had put up in their house.episode occurred a couple months ago but there was no swelling.     Admits to seasonal allergies. Sometimes will immediately break out in hives about once ever couple days- carries hydrocortisone cream for this; this happens at work at [employer]. 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     He has had allergy testing in the past and reports he was positive for: dust, ragweed, mold, grass, cats, trees as a kid- went to allergist as a kid because his hands would get so dry that they would crack and bleed. This was seasonal- summer, spring, fall.    Jaziel denies smoking.   Jaziel does use cocaine and ki occasionally but not around times of the episodes.    Past Medical History:   Patient Active Problem List   Diagnosis     Diarrhea, unspecified type     Adjustment disorder with mixed anxiety and depressed mood     Unprotected sexual intercourse     Cocaine abuse (H)     History reviewed. No pertinent past medical history.  Past Surgical History:   Procedure Laterality Date     HC TOOTH EXTRACTION W/FORCEP  12/28/2010    Topmost teeth       Social History:  Patient reports that he has never smoked. He has never used smokeless tobacco. He reports that he drinks alcohol. He reports that he has current or past drug history. Drugs: Cocaine and MDMA (Ecstasy).    Family History:  Family History   Problem Relation Age of Onset     Substance Abuse Mother       "Anxiety Disorder Mother      Depression Mother      Mental Illness Mother      Depression Father      Anxiety Disorder Father        Medications:  Current Outpatient Medications   Medication Sig Dispense Refill     buPROPion (WELLBUTRIN SR) 150 MG 12 hr tablet Take 450 mg by mouth daily        caffeine (NO-DOZE) 200 MG TABS tablet Take 200 mg by mouth       cholecalciferol (VITAMIN D3) 5000 UNITS CAPS capsule Take by mouth daily       hydrOXYzine (ATARAX) 25 MG tablet Take 1 tablet (25 mg) by mouth 3 times daily as needed for anxiety or other (sleep) 90 tablet 3     Omega-3 Fatty Acids (FISH OIL) 1200 MG capsule Take 1,200 mg by mouth daily       propranolol (INDERAL) 20 MG tablet Take 1 tablet (20 mg) by mouth 2 times daily 180 tablet 3     psyllium 0.52 G capsule Take 1 capsule (0.52 g) by mouth daily 540 capsule        No Known Allergies    Review of Systems:  -As per HPI  -Constitutional: Otherwise feeling well today, in usual state of health.  -HEENT: Patient denies nonhealing oral sores.  -Skin: As above in HPI. No additional skin concerns.    Physical exam:  Vitals: There were no vitals taken for this visit.  GEN: This is a well developed, well-nourished male in no acute distress, in a pleasant mood.    SKIN: Did not specifically examine patients skin- did do prick test on back.  -Patent did have tattoo \"sleeves\" on both arms and left leg.  -No other lesions of concern on areas examined.     Atopy Screen (Placed 09/20/19 )    No Substance Readings (15 min) Evaluation   POS Histamine 1mg/ml ++    NEG NaCl 0.9% -      No Substance Readings (15 min) Evaluation   1 Alternaria alternata (tenuis)  -    2 Cladosporium herbarum -    3 Aspergillus fumigatus -    4 Penicillium notatum -    5 Dermatophagoides pteronyssinus ++++    6 Dermatophagoides farinae ++++    7 Dog epithelium (canis spp) ++    8 Cat hair (yuli catus) +++    9 Cockroach   (Blatella americana & germanica) -    10 Grass mix [place]   (June, Orchard, " Redtop, Luca) +    11 Serg grass (sorghum halepense) -    12 Weed mix   (common Cocklebur, Lamb s quarters, rough redroot Pigweed, Dock/Sorrel) +/++    13 Mug wort (artemisia vulgare) ++    14 Ragweed giant/short (ambrosia spp) +++    15 English Plantain (plantago lanceolata) -    16 Tree mix 1 (Pecan, Maple BHR, Oak RVW, american [place], black [place]) ++++    17 Red cedar (juniperus virginia) +++    18 Tree mix 2   (white Gurdeep, river/red Birch, black [place], common [place], american Elm) ++++    19 Box elder/Maple mix (acer spp) ++++    20 [place] shagbark (carya ovata) ++++               Milk, Meat, Egg, and Grains (09/20/19 )    No Substance Readings (15min) Evaluation   11 Barley (hordeum vulgare) -    12 Buckwheat (fagopyrum esculentum) -    13 Corn (rosa mais) -    14 Hops (humulus lupulus)  -    15 Malt (hordeum vulgare)  -    16 Oat (kallie sativa) -    17 Rice (oryza sativa) -    18 Rye (secale cereale) -    19 Whole wheat (triticum aestivum) -      Conclusion: massive sensitizations to house dust mites and tree pollens and ragweed. Less to grass pollens and other weeds and dog/cat epithelia. No signs for allergy to wheat and similars.     Impression/Plan:    >> Acute Angioedema- in face without breathing problems --> maybe linked to severe sensitization to house dust mites and tree/ragweed pollens.  No medications involved  Atopic predisposition with pollen and house dust mite allergy  No common food exposure.  If repeated angioedema the safety of propranolol with allergic reactions should be evaluated.  Possible aggravation by elicit drug use?    >> mild rhinoconjunctivitis more morning and seasonal aggravation with sensitization to HDM and pollens. However, clinical symptoms are less than expected from severity of allergy tests.    Therapy/Procedure    >> Emergency set: if again allergic reaction take immediately 100mg Prednisone and 2 Tabl Antihistamine (Cetirizine) and then write 12h  retrospective diary. This is to carry with him at all times    >> discussed with patient Immunotherapy, but not sure because clinical manifestation for patient not bothersome.    CC Areli Urlich, APRN CNP  [id number] [address] on close of this encounter.      Staff Involved:    I, Teja Cook MS3, saw and examined the patient under supervision of Dr Salazar.    Staff Physician Comments:  I was present with the medical student who participated in the service and in the documentation of the note. I have verified the history and personally performed the physical exam and medical decision making. I agree with the assessment and plan as documented in the note. I have reviewed and if necessary amended the note.      Lopez Teixeira MD  Professor  Head of Dermato-Allergy Division  Department of Dermatology  [facility]    I spent a total of 40 minutes face to face with Bib Cates during today s office visit. Over 50% of this time was spent counseling the patient and/or coordinating care.  Please see Assessment and Plan for details.  This excludes any time spent performing allergy tests

## 2019-12-20 NOTE — NURSING NOTE
Patient had 30 prick tests placed this visit.    Control Tests (2 tests)    Standard Atopic Panel (20 tests)    Milk, Eggs, and Grains Panel (8 tests)

## 2021-01-03 ENCOUNTER — HEALTH MAINTENANCE LETTER (OUTPATIENT)
Age: 31
End: 2021-01-03

## 2021-01-20 ENCOUNTER — OFFICE VISIT (OUTPATIENT)
Dept: FAMILY MEDICINE | Facility: CLINIC | Age: 31
End: 2021-01-20
Payer: COMMERCIAL

## 2021-01-20 VITALS
DIASTOLIC BLOOD PRESSURE: 70 MMHG | OXYGEN SATURATION: 99 % | SYSTOLIC BLOOD PRESSURE: 102 MMHG | BODY MASS INDEX: 27.55 KG/M2 | WEIGHT: 186 LBS | TEMPERATURE: 98.4 F | HEIGHT: 69 IN | HEART RATE: 67 BPM

## 2021-01-20 DIAGNOSIS — H01.113 ALLERGIC CONTACT DERMATITIS OF EYELIDS OF BOTH EYES: ICD-10-CM

## 2021-01-20 DIAGNOSIS — F43.23 ADJUSTMENT DISORDER WITH MIXED ANXIETY AND DEPRESSED MOOD: ICD-10-CM

## 2021-01-20 DIAGNOSIS — Z00.00 ROUTINE GENERAL MEDICAL EXAMINATION AT A HEALTH CARE FACILITY: Primary | ICD-10-CM

## 2021-01-20 DIAGNOSIS — H01.116 ALLERGIC CONTACT DERMATITIS OF EYELIDS OF BOTH EYES: ICD-10-CM

## 2021-01-20 DIAGNOSIS — G47.00 INSOMNIA, UNSPECIFIED TYPE: ICD-10-CM

## 2021-01-20 PROCEDURE — 90686 IIV4 VACC NO PRSV 0.5 ML IM: CPT | Performed by: NURSE PRACTITIONER

## 2021-01-20 PROCEDURE — 99213 OFFICE O/P EST LOW 20 MIN: CPT | Mod: 25 | Performed by: NURSE PRACTITIONER

## 2021-01-20 PROCEDURE — 99395 PREV VISIT EST AGE 18-39: CPT | Mod: 25 | Performed by: NURSE PRACTITIONER

## 2021-01-20 PROCEDURE — 90471 IMMUNIZATION ADMIN: CPT | Performed by: NURSE PRACTITIONER

## 2021-01-20 RX ORDER — EMOLLIENT BASE
CREAM (GRAM) TOPICAL PRN
Qty: 453 G | Refills: 1 | Status: SHIPPED | OUTPATIENT
Start: 2021-01-20 | End: 2022-03-09

## 2021-01-20 RX ORDER — CETIRIZINE HYDROCHLORIDE 10 MG/1
10 TABLET ORAL DAILY
Qty: 2 TABLET | Refills: 3 | Status: SHIPPED | OUTPATIENT
Start: 2021-01-20 | End: 2021-01-20

## 2021-01-20 RX ORDER — FLUOXETINE 10 MG/1
10 CAPSULE ORAL DAILY
Qty: 90 CAPSULE | Refills: 1 | Status: SHIPPED | OUTPATIENT
Start: 2021-01-20 | End: 2021-06-28 | Stop reason: DRUGHIGH

## 2021-01-20 RX ORDER — BUPROPION HYDROCHLORIDE 150 MG/1
300 TABLET, EXTENDED RELEASE ORAL DAILY
Qty: 60 TABLET | Refills: 0 | Status: SHIPPED | OUTPATIENT
Start: 2021-01-20 | End: 2021-02-16

## 2021-01-20 RX ORDER — HYDROXYZINE HYDROCHLORIDE 50 MG/1
50 TABLET, FILM COATED ORAL
Qty: 90 TABLET | Refills: 1 | Status: SHIPPED | OUTPATIENT
Start: 2021-01-20 | End: 2021-06-28 | Stop reason: SINTOL

## 2021-01-20 RX ORDER — CETIRIZINE HYDROCHLORIDE 10 MG/1
10 TABLET ORAL DAILY
Qty: 90 TABLET | Refills: 3 | Status: SHIPPED | OUTPATIENT
Start: 2021-01-20 | End: 2021-02-22

## 2021-01-20 ASSESSMENT — MIFFLIN-ST. JEOR: SCORE: 1803.68

## 2021-01-20 NOTE — PATIENT INSTRUCTIONS
Preventive Health Recommendations  Male Ages 26 - 39    Yearly exam:             See your health care provider every year in order to  o   Review health changes.   o   Discuss preventive care.    o   Review your medicines if your doctor has prescribed any.    You should be tested each year for STDs (sexually transmitted diseases), if you re at risk.     After age 35, talk to your provider about cholesterol testing. If you are at risk for heart disease, have your cholesterol tested at least every 5 years.     If you are at risk for diabetes, you should have a diabetes test (fasting glucose).  Shots: Get a flu shot each year. Get a tetanus shot every 10 years.     Nutrition:    Eat at least 5 servings of fruits and vegetables daily.     Eat whole-grain bread, whole-wheat pasta and brown rice instead of white grains and rice.     Get adequate Calcium and Vitamin D.     Lifestyle    Exercise for at least 150 minutes a week (30 minutes a day, 5 days a week). This will help you control your weight and prevent disease.     Limit alcohol to one drink per day.     No smoking.     Wear sunscreen to prevent skin cancer.     See your dentist every six months for an exam and cleaning.

## 2021-01-20 NOTE — PROGRESS NOTES
3  SUBJECTIVE:   CC: Bib Cates is an 30 year old male who presents for preventive health visit.     Patient has been advised of split billing requirements and indicates understanding: Yes  Healthy Habits:    Do you get at least three servings of calcium containing foods daily (dairy, green leafy vegetables, etc.)? yes    Amount of exercise or daily activities, outside of work: 7 day(s) per week    Problems taking medications regularly No    Medication side effects: No    Have you had an eye exam in the past two years? yes    Do you see a dentist twice per year? no    Do you have sleep apnea, excessive snoring or daytime drowsiness? yes    Drinking less alcohol, some depression weeks hard, wellbutrin not working well enough, used to see psych but counseling also so expensive  Good self care now again, starting to exercise more, working at [employer] since [employer]    Eczematous rash around eyes, showed me pics of when it was bad and not sure what cuase is, although does have history of severe reaction to environment-was tested and tree allergies the worst, also duse mites and cats he has been a round alittle lately, alexandra house maybe. aquaphor has been helping    Gets itchy rectum and fissues at times so blood on tp mostly doesn't think has hemmorhoid and no abd pain    Today's PHQ-2 Score:   PHQ-2 ( 1999 [employer]) 7/17/2019 1/17/2018   Q1: Little interest or pleasure in doing things 1 0   Q2: Feeling down, depressed or hopeless 1 0   PHQ-2 Score 2 0       Abuse: Current or Past(Physical, Sexual or Emotional)- No  Do you feel safe in your environment? Yes    Social History     Tobacco Use     Smoking status: Never Smoker     Smokeless tobacco: Never Used   Substance Use Topics     Alcohol use: Yes     Comment: 5/week     If you drink alcohol do you typically have >3 drinks per day or >7 drinks per week? No                      Last PSA: No results found for: PSA    Reviewed orders with patient. Reviewed  health maintenance and updated orders accordingly - Yes  Labs reviewed in EPIC  BP Readings from Last 3 Encounters:   01/20/21 102/70   07/17/19 125/75   10/08/18 112/78    Wt Readings from Last 3 Encounters:   01/20/21 84.4 kg (186 lb)   07/17/19 81.2 kg (179 lb 1.6 oz)   01/17/18 79.3 kg (174 lb 12.8 oz)                  Patient Active Problem List   Diagnosis     Diarrhea, unspecified type     Adjustment disorder with mixed anxiety and depressed mood     Unprotected sexual intercourse     Cocaine abuse (H)     Past Surgical History:   Procedure Laterality Date     HC TOOTH EXTRACTION W/FORCEP  12/28/2010    Hamel teeth       Social History     Tobacco Use     Smoking status: Never Smoker     Smokeless tobacco: Never Used   Substance Use Topics     Alcohol use: Yes     Comment: 5/week     Family History   Problem Relation Age of Onset     Substance Abuse Mother      Anxiety Disorder Mother      Depression Mother      Mental Illness Mother      Depression Father      Anxiety Disorder Father          Current Outpatient Medications   Medication Sig Dispense Refill     buPROPion (WELLBUTRIN SR) 150 MG 12 hr tablet Take 2 tablets (300 mg) by mouth daily 60 tablet 0     caffeine (NO-DOZE) 200 MG TABS tablet Take 200 mg by mouth       cetirizine (ZYRTEC) 10 MG tablet Take 1 tablet (10 mg) by mouth daily 90 tablet 3     cholecalciferol (VITAMIN D3) 125 mcg (5000 units) capsule Take 1 capsule (125 mcg) by mouth daily 90 capsule 3     emollient (VANICREAM) external cream Apply topically as needed for other (dry skin or eczema) 453 g 1     FLUoxetine (PROZAC) 10 MG capsule Take 1 capsule (10 mg) by mouth daily 90 capsule 1     hydrOXYzine (ATARAX) 50 MG tablet Take 1 tablet (50 mg) by mouth nightly as needed for anxiety 90 tablet 1     Omega-3 Fatty Acids (FISH OIL) 1200 MG capsule Take 1,200 mg by mouth daily       predniSONE (DELTASONE) 50 MG tablet Emergency set: if again allergic reaction take immediately 100mg  "Prednisone and 2 Tabl Antihistamine (Cetirizine) and then write 12h retrospective diary 2 tablet 3     psyllium 0.52 G capsule Take 1 capsule (0.52 g) by mouth daily 540 capsule      Allergies   Allergen Reactions     Cats      Dust Mites      Trees      Recent Labs   Lab Test 10/08/18  1023 01/17/18  1039   *  --    HDL 55  --    TRIG 72  --    ALT  --  26   CR  --  1.08   GFRESTIMATED  --  83   GFRESTBLACK  --  >90   POTASSIUM  --  3.7   TSH  --  3.73        Reviewed and updated as needed this visit by clinical staff                 Reviewed and updated as needed this visit by Provider                    ROS:  Constitutional, eye, ENT, skin, breast, respiratory, cardiac, GI, , MSK, neuro, psych, and allergy are normal except as otherwise noted.      OBJECTIVE:   /70   Pulse 67   Temp 98.4  F (36.9  C) (Temporal)   Ht 1.76 m (5' 9.29\")   Wt 84.4 kg (186 lb)   SpO2 99%   BMI 27.24 kg/m    EXAM:  GENERAL: healthy, alert and no distress  EYES: Eyes grossly normal to inspection, PERRL and EOMI  HENT: normal cephalic/atraumatic, ear canals and TM's normal, nose and mouth without ulcers or lesions, rhinorrhea clear, oropharynx clear and oral mucous membranes moist  NECK: no adenopathy, no asymmetry, masses, or scars and thyroid normal to palpation  RESP: lungs clear to auscultation - no rales, rhonchi or wheezes  CV: regular rate and rhythm, normal S1 S2, no S3 or S4, no murmur, click or rub, no peripheral edema and peripheral pulses strong  ABDOMEN: soft, nontender, no hepatosplenomegaly, no masses and bowel sounds normal   (male): normal male genitalia without lesions or urethral discharge, no hernia  MS: no gross musculoskeletal defects noted, no edema  SKIN: no suspicious lesions or rashes  NEURO: Normal strength and tone, mentation intact and speech normal  PSYCH: mentation appears normal, affect normal/bright    Diagnostic Test Results:  Labs reviewed in Epic    ASSESSMENT/PLAN:       " ICD-10-CM    1. Routine general medical examination at a health care facility  Z00.00    2. Insomnia, unspecified type  G47.00 hydrOXYzine (ATARAX) 50 MG tablet     MENTAL HEALTH REFERRAL  - Adult; Outpatient Treatment; Individual/Couples/Family/Group Therapy/Health Psychology; [facility]: [facility] [phone]; We will contact you to schedule the appointment or please call with any questions     OFFICE/OUTPT VISIT,EST,LEVL III   3. Adjustment disorder with mixed anxiety and depressed mood  F43.23 hydrOXYzine (ATARAX) 50 MG tablet     FLUoxetine (PROZAC) 10 MG capsule     buPROPion (WELLBUTRIN SR) 150 MG 12 hr tablet     cholecalciferol (VITAMIN D3) 125 mcg (5000 units) capsule     MENTAL HEALTH REFERRAL  - Adult; Outpatient Treatment; Individual/Couples/Family/Group Therapy/Health Psychology; [facility]: [facility] [phone]; We will contact you to schedule the appointment or please call with any questions     OFFICE/OUTPT VISIT,EST,LEVL III   4. Allergic contact dermatitis of eyelids of both eyes  H01.113 emollient (VANICREAM) external cream    H01.116 OFFICE/OUTPT VISIT,EST,LEVL III   transferring psych care to me, will lower wellbutrin and start prozac, follow up with longer mental health visit david mckeon a few weeks or sooner prn   Start counseling, can do labs future no std check needed not active    Discussed the pathophysiology of anxiety episodes and the various symptoms seen associated with anxiety episodes.  Discussed possible triggers including fatigue, depression, stress, and chemicals such as alcohol, caffeine and certain drugs.  Discussed the treatment including an aerobic exercise program, adequate rest, and both rescue meds and maintenance meds.    Discussed need for gradual increase of SSRI dose over time, titrating to effect.  Reviewed potential for initial side effects (such as headache, GI symptoms, and dry mouth) that will likely subside after a week or so,  "but that therapeutic effects will likely take 1-2 weeks - so it's important to stick with medication for at least a month to adequately gauge effect.  Notify me of any significant side effects.  Discussed that treatment with SSRI medications requires a minimum commitment of 9-12 months; shorter courses are associated with rebound symptoms.  Discussed potential long-term side effects including sexual side effects.      See patient instructions and allergy meds, updated allergy history and can follow up more if needed prn     Patient has been advised of split billing requirements and indicates understanding: Yes  COUNSELING:  Reviewed preventive health counseling, as reflected in patient instructions    Estimated body mass index is 27.23 kg/m  as calculated from the following:    Height as of 7/17/19: 1.727 m (5' 8\").    Weight as of 7/17/19: 81.2 kg (179 lb 1.6 oz).    Weight management plan: Discussed healthy diet and exercise guidelines    He reports that he has never smoked. He has never used smokeless tobacco.      Counseling Resources:  ATP IV Guidelines  Pooled Cohorts Equation Calculator  FRAX Risk Assessment  ICSI Preventive Guidelines  Dietary Guidelines for Americans, 2010  USDA's MyPlate  ASA Prophylaxis  Lung CA Screening    Areli Ulrich, CASI CRAFT  [facility]  "

## 2021-02-14 DIAGNOSIS — F43.23 ADJUSTMENT DISORDER WITH MIXED ANXIETY AND DEPRESSED MOOD: ICD-10-CM

## 2021-02-16 RX ORDER — BUPROPION HYDROCHLORIDE 150 MG/1
300 TABLET, EXTENDED RELEASE ORAL DAILY
Qty: 60 TABLET | Refills: 0 | Status: SHIPPED | OUTPATIENT
Start: 2021-02-16 | End: 2021-06-28

## 2021-02-16 NOTE — TELEPHONE ENCOUNTER
Bupropion:    Note from 1/20/21:   Return in about 3 weeks (around 2/10/2021) for mental health follow up visit virtual.           Rx 1/20/21 for #60, no refill    Mychart message sent to patient asking him to schedule appointment.    Indira Lomax RN

## 2021-02-17 NOTE — TELEPHONE ENCOUNTER
"Requested Prescriptions   Pending Prescriptions Disp Refills     buPROPion (WELLBUTRIN SR) 150 MG 12 hr tablet 60 tablet 0     Sig: Take 2 tablets (300 mg) by mouth daily       SSRIs Protocol Failed - 2/16/2021 11:50 AM        Failed - PHQ-9 score less than 5 in past 6 months     Please review last PHQ-9 score.           Passed - Medication is Bupropion     If the medication is Bupropion (Wellbutrin), and the patient is taking for smoking cessation; OK to refill.          Passed - Medication is active on med list        Passed - Patient is age 18 or older        Passed - Recent (6 mo) or future (30 days) visit within the authorizing provider's specialty     Patient had office visit in the last 6 months or has a visit in the next 30 days with authorizing provider or within the authorizing provider's specialty.  See \"Patient Info\" tab in inbasket, or \"Choose Columns\" in Meds & Orders section of the refill encounter.               RJ Reception Medication is being filled for 1 time refill only due to:  Patient needs to be seen because due for 1 month f/u.     Signed Prescriptions:                        Disp   Refills    buPROPion (WELLBUTRIN SR) 150 MG 12 hr tab*60 tab*0        Sig: Take 2 tablets (300 mg) by mouth daily  Authorizing Provider: EDWARD REYES  Ordering User: JELANI THOMSON        "

## 2021-02-22 ENCOUNTER — VIRTUAL VISIT (OUTPATIENT)
Dept: FAMILY MEDICINE | Facility: CLINIC | Age: 31
End: 2021-02-22
Payer: COMMERCIAL

## 2021-02-22 DIAGNOSIS — Z91.09 ENVIRONMENTAL ALLERGIES: Primary | ICD-10-CM

## 2021-02-22 DIAGNOSIS — F43.23 ADJUSTMENT DISORDER WITH MIXED ANXIETY AND DEPRESSED MOOD: ICD-10-CM

## 2021-02-22 PROCEDURE — 99213 OFFICE O/P EST LOW 20 MIN: CPT | Mod: 95 | Performed by: NURSE PRACTITIONER

## 2021-02-22 RX ORDER — CETIRIZINE HYDROCHLORIDE 10 MG/1
10 TABLET ORAL DAILY
Qty: 90 TABLET | Refills: 3 | Status: SHIPPED | OUTPATIENT
Start: 2021-02-22 | End: 2021-09-13

## 2021-02-22 RX ORDER — BUPROPION HYDROCHLORIDE 300 MG/1
300 TABLET ORAL EVERY MORNING
Qty: 90 TABLET | Refills: 1 | Status: SHIPPED | OUTPATIENT
Start: 2021-02-22 | End: 2021-06-28

## 2021-02-22 ASSESSMENT — ANXIETY QUESTIONNAIRES
GAD7 TOTAL SCORE: 6
5. BEING SO RESTLESS THAT IT IS HARD TO SIT STILL: MORE THAN HALF THE DAYS
3. WORRYING TOO MUCH ABOUT DIFFERENT THINGS: SEVERAL DAYS
6. BECOMING EASILY ANNOYED OR IRRITABLE: NOT AT ALL
IF YOU CHECKED OFF ANY PROBLEMS ON THIS QUESTIONNAIRE, HOW DIFFICULT HAVE THESE PROBLEMS MADE IT FOR YOU TO DO YOUR WORK, TAKE CARE OF THINGS AT HOME, OR GET ALONG WITH OTHER PEOPLE: SOMEWHAT DIFFICULT
7. FEELING AFRAID AS IF SOMETHING AWFUL MIGHT HAPPEN: NOT AT ALL
2. NOT BEING ABLE TO STOP OR CONTROL WORRYING: SEVERAL DAYS
1. FEELING NERVOUS, ANXIOUS, OR ON EDGE: SEVERAL DAYS

## 2021-02-22 ASSESSMENT — PATIENT HEALTH QUESTIONNAIRE - PHQ9
5. POOR APPETITE OR OVEREATING: SEVERAL DAYS
SUM OF ALL RESPONSES TO PHQ QUESTIONS 1-9: 10

## 2021-02-22 NOTE — PROGRESS NOTES
Jaziel is a 30 year old who is being evaluated via a billable video visit.      How would you like to obtain your AVS? MyChart  If the video visit is dropped, the invitation should be resent by: Text to cell phone: [phone]   Will anyone else be joining your video visit? No      Video Start Time: 9:01 AM    Assessment & Plan       ICD-10-CM    1. Environmental allergies  Z91.09 cetirizine (ZYRTEC) 10 MG tablet   2. Adjustment disorder with mixed anxiety and depressed mood  F43.23 buPROPion (WELLBUTRIN XL) 300 MG 24 hr tablet    change Wellbutrin to XR and see if helps with dreams and sleep  Continue prozac at current dose     Depression Screening Follow Up    PHQ 2/22/2021   PHQ-9 Total Score 10   Q9: Thoughts of better off dead/self-harm past 2 weeks Several days     Last PHQ-9 2/22/2021   1.  Little interest or pleasure in doing things 1   2.  Feeling down, depressed, or hopeless 1   3.  Trouble falling or staying asleep, or sleeping too much 1   4.  Feeling tired or having little energy 1   5.  Poor appetite or overeating 1   6.  Feeling bad about yourself 2   7.  Trouble concentrating 2   8.  Moving slowly or restless 0   Q9: Thoughts of better off dead/self-harm past 2 weeks 1   PHQ-9 Total Score 10   Difficulty at work, home, or with people Somewhat difficult         Return in about 3 months (around 5/22/2021) for for follow up visit, earlier if any concerns.    CASI Constantino CNP  [facility]    Yash Sheriff is a 30 year old who presents for the following health issues     HPI       Depression and Anxiety Follow-Up    How are you doing with your depression since your last visit? Improved     How are you doing with your anxiety since your last visit?  Improved     Are you having other symptoms that might be associated with depression or anxiety? No    Have you had a significant life event? No     Do you have any concerns with your use of alcohol or other drugs? No      prozac check and wellbutrin, weird dreams and lower libido which are not a problem right now  No relapse or self harm, feels like it's helping and wishes to remain on current doses  Passive SI, no plan and less than in past     Social History     Tobacco Use     Smoking status: Never Smoker     Smokeless tobacco: Never Used   Substance Use Topics     Alcohol use: Yes     Comment: 5/week     Drug use: Yes     Types: Cocaine, MDMA (Ecstasy)     PHQ 7/17/2019 2/22/2021   PHQ-9 Total Score 10 10   Q9: Thoughts of better off dead/self-harm past 2 weeks Several days Several days     BRITTANY-7 SCORE 7/17/2019 2/22/2021   Total Score 4 6     Last PHQ-9 2/22/2021   1.  Little interest or pleasure in doing things 1   2.  Feeling down, depressed, or hopeless 1   3.  Trouble falling or staying asleep, or sleeping too much 1   4.  Feeling tired or having little energy 1   5.  Poor appetite or overeating 1   6.  Feeling bad about yourself 2   7.  Trouble concentrating 2   8.  Moving slowly or restless 0   Q9: Thoughts of better off dead/self-harm past 2 weeks 1   PHQ-9 Total Score 10   Difficulty at work, home, or with people Somewhat difficult     BRITTANY-7  2/22/2021   1. Feeling nervous, anxious, or on edge 1   2. Not being able to stop or control worrying 1   3. Worrying too much about different things 1   4. Trouble relaxing 1   5. Being so restless that it is hard to sit still 2   6. Becoming easily annoyed or irritable 0   7. Feeling afraid, as if something awful might happen 0   BRITTANY-7 Total Score 6   If you checked any problems, how difficult have they made it for you to do your work, take care of things at home, or get along with other people? Somewhat difficult       Suicide Assessment Five-step Evaluation and Treatment (SAFE-T)      How many servings of fruits and vegetables do you eat daily?  2-3    On average, how many sweetened beverages do you drink each day (Examples: soda, juice, sweet tea, etc.  Do NOT count diet or  artificially sweetened beverages)?   0    How many days per week do you exercise enough to make your heart beat faster? 5    How many minutes a day do you exercise enough to make your heart beat faster? 60 or more    How many days per week do you miss taking your medication? 0    Environmental allergies and eczema--doing well on zyrtec, needs refill     Review of Systems   Constitutional, HEENT, cardiovascular, pulmonary, GI, , musculoskeletal, neuro, skin, endocrine and psych systems are negative, except as otherwise noted.      Objective           Vitals:  No vitals were obtained today due to virtual visit.    Physical Exam   GENERAL: Healthy, alert and no distress  EYES: Eyes grossly normal to inspection.  No discharge or erythema, or obvious scleral/conjunctival abnormalities.  RESP: No audible wheeze, cough, or visible cyanosis.  No visible retractions or increased work of breathing.    SKIN: Visible skin clear. No significant rash, abnormal pigmentation or lesions.  NEURO: Cranial nerves grossly intact.  Mentation and speech appropriate for age.  PSYCH: Mentation appears normal, affect normal/bright, judgement and insight intact, normal speech and appearance well-groomed.                Video-Visit Details    Type of service:  Video Visit    Video End Time:9:20 AM    Originating Location (pt. Location): Home    Distant Location (provider location):  [facility]     Platform used for Video Visit: Retargetly

## 2021-02-23 ASSESSMENT — ANXIETY QUESTIONNAIRES: GAD7 TOTAL SCORE: 6

## 2021-03-18 DIAGNOSIS — F43.23 ADJUSTMENT DISORDER WITH MIXED ANXIETY AND DEPRESSED MOOD: ICD-10-CM

## 2021-03-22 RX ORDER — BUPROPION HYDROCHLORIDE 150 MG/1
300 TABLET, EXTENDED RELEASE ORAL DAILY
Qty: 180 TABLET | Refills: 0 | Status: CANCELLED | OUTPATIENT
Start: 2021-03-22

## 2021-03-22 RX ORDER — BUPROPION HYDROCHLORIDE 300 MG/1
300 TABLET ORAL EVERY MORNING
Qty: 90 TABLET | Refills: 1 | Status: CANCELLED | OUTPATIENT
Start: 2021-03-22

## 2021-03-22 NOTE — TELEPHONE ENCOUNTER
Routing refill request to provider for review/approval because:  PHQ9 score on 2/22/21 was 10    Med was changed at visit to Wellbutrin to XR and see if helps with dreams and sleep    Gregoria Hernandez RN   [facility]

## 2021-03-23 NOTE — TELEPHONE ENCOUNTER
Did changing to the 300 mg XR help with his sleep? Then well do the 3 month prescription of this one rather than the other one pended here (for sure don't sign for both)    Thanks,   Areli LANCE CNP

## 2021-03-24 NOTE — TELEPHONE ENCOUNTER
Verified with patient and the wellbutrin 300 mg XR is working better. He still has pills left as well as a refill. He will do a 6 month follow up from when he started new medication    Hansa Martinez RN   [facility]

## 2021-06-21 ENCOUNTER — MYC MEDICAL ADVICE (OUTPATIENT)
Dept: FAMILY MEDICINE | Facility: CLINIC | Age: 31
End: 2021-06-21

## 2021-06-28 ENCOUNTER — VIRTUAL VISIT (OUTPATIENT)
Dept: FAMILY MEDICINE | Facility: CLINIC | Age: 31
End: 2021-06-28

## 2021-06-28 DIAGNOSIS — F41.1 GAD (GENERALIZED ANXIETY DISORDER): ICD-10-CM

## 2021-06-28 DIAGNOSIS — F43.23 ADJUSTMENT DISORDER WITH MIXED ANXIETY AND DEPRESSED MOOD: ICD-10-CM

## 2021-06-28 DIAGNOSIS — Z59.71 DOES NOT HAVE HEALTH INSURANCE: ICD-10-CM

## 2021-06-28 DIAGNOSIS — R45.88 NON-SUICIDAL SELF HARM: ICD-10-CM

## 2021-06-28 DIAGNOSIS — F32.1 MODERATE MAJOR DEPRESSION (H): Primary | ICD-10-CM

## 2021-06-28 DIAGNOSIS — F19.11 HX OF SUBSTANCE ABUSE (H): ICD-10-CM

## 2021-06-28 PROCEDURE — 99214 OFFICE O/P EST MOD 30 MIN: CPT | Mod: 95 | Performed by: NURSE PRACTITIONER

## 2021-06-28 RX ORDER — BUSPIRONE HYDROCHLORIDE 5 MG/1
5 TABLET ORAL 2 TIMES DAILY
Qty: 90 TABLET | Refills: 1 | Status: SHIPPED | OUTPATIENT
Start: 2021-06-28 | End: 2021-07-12

## 2021-06-28 RX ORDER — BUPROPION HYDROCHLORIDE 300 MG/1
300 TABLET ORAL EVERY MORNING
Qty: 90 TABLET | Refills: 1 | Status: SHIPPED | OUTPATIENT
Start: 2021-06-28 | End: 2021-08-12

## 2021-06-28 NOTE — PROGRESS NOTES
Jaziel is a 30 year old who is being evaluated via a billable video visit.      How would you like to obtain your AVS? MyChart  If the video visit is dropped, the invitation should be resent by: Text to cell phone: [phone]   Will anyone else be joining your video visit? No      Video Start Time: 10:23 AM    Assessment & Plan       ICD-10-CM    1. Moderate major depression (H)  F32.1 FLUoxetine (PROZAC) 20 MG capsule     CARE COORDINATION REFERRAL     MENTAL HEALTH REFERRAL  - Adult; Psychiatry, Outpatient Treatment; Individual/Couples/Family/Group Therapy/Health Psychology; [facility]: [facility] [phone]; We will contact you to schedule the appointment or please call with any ...   2. Adjustment disorder with mixed anxiety and depressed mood  F43.23 buPROPion (WELLBUTRIN XL) 300 MG 24 hr tablet     CARE COORDINATION REFERRAL     MENTAL [facility] REFERRAL  - Adult; Psychiatry, Outpatient Treatment; Individual/Couples/Family/Group Therapy/Health Psychology; [facility]: [facility] [phone]; We will contact you to schedule the appointment or please call with any ...   3. BRITTANY (generalized anxiety disorder)  F41.1 busPIRone (BUSPAR) 5 MG tablet     FLUoxetine (PROZAC) 20 MG capsule     CARE COORDINATION REFERRAL     MENTAL HEALTH REFERRAL  - Adult; Psychiatry, Outpatient Treatment; Individual/Couples/Family/Group Therapy/Health Psychology; [facility]: [facility] [phone]; We will contact you to schedule the appointment or please call with any ...   4. Non-suicidal self harm  R45.89 CARE COORDINATION REFERRAL     MENTAL HEALTH REFERRAL  - Adult; Psychiatry, Outpatient Treatment; Individual/Couples/Family/Group Therapy/Health Psychology; [facility]: [facility] [phone]; We will contact you to schedule the appointment or please call with any ...   5. Does not have health insurance  Z59.8 CARE COORDINATION REFERRAL   6. Hx of substance abuse (H)  F19.11 MENTAL  HEALTH REFERRAL  - Adult; Psychiatry, Outpatient Treatment; Individual/Couples/Family/Group Therapy/Health Psychology; G: [facility] [phone]; We will contact you to schedule the appointment or please call with any ...    worsening depression, panic anxiety and SI passive but is punching himself to deal with this, urge to do cutting   Contracted for safety, no plan   Good self care otherwise  discussed med options, trial buspar and increase prozac, continue buproprion-- consider brand name perhaps less nightmares  Consider adding gabapentin, or switch to SNRI recommended he follow up with Psych, wants to avoid for now due to insurance lack  Reached out to [facility] to possibly bridge pt, and check on him      There are no Patient Instructions on file for this visit.    Return in about 3 weeks (around 7/19/2021) for mental health follow up visit.    CASI Constantino CNP  [facility] MEGA Sheriff is a 30 year old who presents for the following health issues     HPI     Depression and Anxiety Follow-Up    How are you doing with your depression since your last visit? Worsened     How are you doing with your anxiety since your last visit?  Worsened     Are you having other symptoms that might be associated with depression or anxiety? Yes:  Headaches    Have you had a significant life event? Job Concerns     Do you have any concerns with your use of alcohol or other drugs? No    Social History     Tobacco Use     Smoking status: Never Smoker     Smokeless tobacco: Never Used   Substance Use Topics     Alcohol use: Yes     Comment: 5/week     Drug use: Yes     Types: Cocaine, MDMA (Ecstasy)     PHQ 7/17/2019 2/22/2021   PHQ-9 Total Score 10 10   Q9: Thoughts of better off dead/self-harm past 2 weeks Several days Several days     BRITTANY-7 SCORE 7/17/2019 2/22/2021   Total Score 4 6     Last PHQ-9 2/22/2021   1.  Little interest or pleasure in doing things 1   2.  Feeling  "down, depressed, or hopeless 1   3.  Trouble falling or staying asleep, or sleeping too much 1   4.  Feeling tired or having little energy 1   5.  Poor appetite or overeating 1   6.  Feeling bad about yourself 2   7.  Trouble concentrating 2   8.  Moving slowly or restless 0   Q9: Thoughts of better off dead/self-harm past 2 weeks 1   PHQ-9 Total Score 10   Difficulty at work, home, or with people Somewhat difficult     BRITTANY-7  2/22/2021   1. Feeling nervous, anxious, or on edge 1   2. Not being able to stop or control worrying 1   3. Worrying too much about different things 1   4. Trouble relaxing 1   5. Being so restless that it is hard to sit still 2   6. Becoming easily annoyed or irritable 0   7. Feeling afraid, as if something awful might happen 0   BRITTANY-7 Total Score 6   If you checked any problems, how difficult have they made it for you to do your work, take care of things at home, or get along with other people? Somewhat difficult       Suicide Assessment Five-step Evaluation and Treatment (SAFE-T)      How many servings of fruits and vegetables do you eat daily?  0-1    On average, how many sweetened beverages do you drink each day (Examples: soda, juice, sweet tea, etc.  Do NOT count diet or artificially sweetened beverages)?   0    How many days per week do you exercise enough to make your heart beat faster? 4    How many minutes a day do you exercise enough to make your heart beat faster? 10 - 19    How many days per week do you miss taking your medication? 0    \"Thoughts spiraling more and more and depression traps  Punching myself sometimes  wanting to cut myself, feeling like stuck in a rut\"    he told Therapists in the past , thoughts coming back   Willing to do counseling if free or inexpensive   No health insurance   Working new job bartending again  Decided to be sober for the next month, sad drunk when he does decide to drink he drinks a lot at a time then feels terrible   Has to taste beer at " [employer] where he works, but otherwise not drinking off days has none, no withdrawal symptoms or concerns    Wants to change meds  Sleep is okay, dreams a lot sometimes bad  Snoring but cannot do anything about this right now  Libido has been poor, still on low dose of prozac never tried increasing  Hydroxyzine made him sleepy all day the next day-took 50 mg doesn't want to try lower dose       Review of Systems   Constitutional, HEENT, cardiovascular, pulmonary, GI, , musculoskeletal, neuro, skin, endocrine and psych systems are negative, except as otherwise noted.      Objective           Vitals:  No vitals were obtained today due to virtual visit.    Physical Exam   GENERAL: Healthy, alert and no distress  EYES: Eyes grossly normal to inspection.  No discharge or erythema, or obvious scleral/conjunctival abnormalities.  RESP: No audible wheeze, or visible cyanosis, does have freq dry cough,  No visible retractions or increased work of breathing.    SKIN: Visible skin clear. No significant rash, abnormal pigmentation or lesions.  NEURO: Cranial nerves grossly intact.  Mentation and speech appropriate for age.  PSYCH: mentation appears normal, anxious, judgement and insight intact, appearance well groomed and smiling when nearly tearful at times                Video-Visit Details    Type of service:  Video Visit    Video End Time:10:53 AM    Originating Location (pt. Location): Home    Distant Location (provider location):  [facility]     Platform used for Video Visit: Cube Route

## 2021-06-28 NOTE — Clinical Note
Kavon Willson, any way we can bridge pt for counseling while no insurance/ new job?self harming, contracted for safety see my note for details  I'll have  reach out to him as well for resources. Arlei Dwyer

## 2021-06-29 ENCOUNTER — PATIENT OUTREACH (OUTPATIENT)
Dept: CARE COORDINATION | Facility: CLINIC | Age: 31
End: 2021-06-29

## 2021-06-29 NOTE — PATIENT INSTRUCTIONS
"  Patient Education     Forms of Depression  Depression can happen to anyone--man or woman, young or old. Sometimes it occurs for a reason, such as illness or grief. At other times, no cause can be found.  If you have depression, you cannot simply change your attitude or \"pull yourself out of it.\" You need treatment from a doctor, a therapist, or both.   The following is a list of the most common types of depression.  Reactive depression  Also called \"adjustment disorder with depressed mood.\" Symptoms of depression occur in response to major stress. For example, job loss, moving, the death of a loved one or a troubled relationship all can cause symptoms.  Dysthymia   Dysthymia is mild depression that lasts for at least two years (or at least one year in children). You may feel sad and tired almost every day. You might have low self-esteem. You may worry that you will never feel \"normal\" again.  Major depression   When symptoms last for at least two weeks and seem to have no cause, it is called severe depression. This is a serious illness that affects your mood, your thoughts and even your body. It changes the way you eat, sleep and interact with others. If symptoms are not treated, they can last for months or even years.  Postpartum depression  Postpartum depression may occur in women who have just had a baby. Symptoms last more than two weeks. They may be mild or severe.  Seasonal affective disorder (SAD)  SAD is a type of depression that occurs in the winter months, when there is less sunlight. Symptoms may begin in the late fall, peaking in the winter. When spring comes and the days grow longer, you start to feel better.   Bipolar disorder  Bipolar disorder causes severe mood swings that affect your thoughts, behavior and the way you function in life. This illness used to be called manic-depressive disorder. In the \"manic\" phase, you have lots of energy. You might talk a lot, sleep very little and act in reckless " "ways. In the \"depressed\" phase, you feel sad and low.   continued  Mood disorder caused by physical illness   Certain illnesses create changes in the body that can cause symptoms of depression. Examples include stroke, seizure disorders, Parkinson's disease and some cancers. This is not the same as depression that might occur when you are coping with medical problems.  Depression caused by alcohol and other drugs   Illegal drugs, alcohol and certain medicines can all cause symptoms of depression.   Where can I get more information?  [facility]: [phone]   ([facility]) or [phone] ([facility]: [phone]   or [phone]  Depression and [facility]ipolar [facility] [place]:   [phone], www.dbsalliance.org  National [place] of Mental Health:   787-529-8353, www.nimh.nih.gov   National [place] on Mental Illness:   482-266-ZDPL [0464], www.JOSE MANUEL.org  National Mental Health Association:   001-869-JUJO [6642], www.NMHA.org  For informational purposes only. Not to replace the advice of your health care provider.   Copyright   2006 [facility]. All rights reserved.   Clinically reviewed by Radha Yun. Itiva [id number] - REV 04/18.           Patient Education     Your Body s Response to Anxiety  Normal anxiety is part of the body s natural defense system. It's an alert to a threat that is unknown, vague, or comes from your own internal fears. While you re in this state, your feelings can range from a vague sense of worry to physical sensations such as a pounding heartbeat. These feelings make you want to react to the threat. An anxiety response is normal in many situations. But when you have an anxiety disorder, the same response can occur at the wrong times.   Anxiety can be helpful  Normal anxiety is a signal from your brain. It warns you of a threat. It's a normal response to help you prevent something. Or to decrease the bad effects of something you " can't control. For example, anxiety is a normal response to situations that might harm your body, separate you from a loved one, or lose your job. The symptoms of anxiety can be physical and mental.   How does it feel?  People with anxiety may have:    Dizziness    Muscle tension or pain    Restlessness    Sleeplessness    Trouble focusing    Racing heartbeat    Fast breathing    Shaking or trembling    Stomachache    Diarrhea    Loss of energy    Sweating    Cold, clammy hands    Chest pain    Dry mouth  Anxiety can also be a problem  Anxiety can become a problem when it is hard to control, occurs for months, and interferes with important parts of your life. With an anxiety disorder, your body has the response described above, but in inappropriate ways. The response a person has depends on the anxiety disorder he or she has. With some disorders, the anxiety is way out of proportion to the threat that triggers it. With others, anxiety may occur even when there isn t a clear threat or trigger.   Who does it affect?  Some people are more likely to have lasting anxiety than others. It tends to run in families. And it affects more younger people than older people, and more women than men. But no age, race, or gender is immune to anxiety problems.   Anxiety can be treated  The good news is that the anxiety that s disrupting your life can be treated. Check with your healthcare provider and rule out any physical problems that may be causing the anxiety symptoms. If an anxiety disorder is diagnosed, seek mental healthcare. This is an illness and it can respond to treatment. Most types of anxiety disorders will respond to talk therapy (counseling) and medicines. Working with your doctor or other healthcare provider, you can develop skills to help you cope with anxiety. You can also gain the perspective you need to overcome your fears. Good sources of support or guidance can be found at your local [facility], mental health  clinic, or an employee assistance program.     How to cope with anxiety  Here are some things you can do to cope:    Do what you can.  Keep in mind that you can t control everything. Change what you can. And let the rest take its course.    Exercise. This is a great way to ease tension and help your body feel relaxed.    Stay away from caffeine and nicotine.  These can make anxiety symptoms worse.    Stay sober.  Don't use alcohol or unprescribed medicines. They only make things worse in the long run.    Learn more about anxiety disorders.  Keep track of helpful online resources and books you can use during stressful periods.    Try stress management. Try methods such as meditation.    Talk with others. Think about joining online or in-person support groups.    Get help. Find professional mental health services if your symptoms can't be managed or reduced with the above methods.  Christiano last reviewed this educational content on 4/1/2020 2000-2021 [employer]. All rights reserved. This information is not intended as a substitute for professional medical care. Always follow your healthcare professional's instructions.

## 2021-06-29 NOTE — PROGRESS NOTES
Clinic Care Coordination Contact  [facility]/Voicemail    Left message on pt's VM this morning.    Clinical Data: Care Coordinator Outreach  Outreach attempted x 1.  Left message on patient's voicemail with call back information and requested return call.  Plan:  Care Coordinator will try to reach patient again in 1-2 business days.    Marcy Connell  Community Health Worker  [facility]  [phone]

## 2021-06-30 ENCOUNTER — PATIENT OUTREACH (OUTPATIENT)
Dept: NURSING | Facility: CLINIC | Age: 31
End: 2021-06-30
Attending: NURSE PRACTITIONER

## 2021-06-30 NOTE — PROGRESS NOTES
Clinic Care Coordination Contact  Community Health Worker Initial Outreach    Spoke with pt this afternoon via phone outreach. Had left VM with pt yesterday with no call back.  CHW introduced self and intent of call regarding referral from PCP stating the following:  Reason for Referral: Financial Support: Insurance and counseling resources please, asked [facility] if could bridge pt    CHW introduced Care Coordination and assessment for additional support/resources.  Pt reports he currently does not have any insurance. Once pt has insurance he would like to be connected to a MH professional. Note MH referral has been place with [facility] on 6/29/21 by CASI Anderson CNP.    CHW Initial Information Gathering:  Referral Source: PCP  Preferred Hospital: [facility]  [phone]  Current living arrangement:: I live alone  Type of residence:: Apartment  Community Resources: None  Supplies used at home:: None  Equipment Currently Used at Home: none  Informal Support system:: Friends  No PCP office visit in Past Year: No  Transportation means:: Regular car  CHW Additional Questions  If ED/Hospital discharge, follow-up appointment scheduled as recommended?: N/A  Medication changes made following ED/Hospital discharge?: N/A  MyChart active?: Yes  Patient sent Social Determinants of Health questionnaire?: Yes    Patient accepts CC: Yes. Patient scheduled for assessment with DIONI Manning, on 7/1/21 at 11:00am. Patient noted desire to discuss insurance and mental health counseling.     Marcy Connell  Community Health Worker  [facility]  [phone]

## 2021-07-01 ENCOUNTER — PATIENT OUTREACH (OUTPATIENT)
Dept: NURSING | Facility: CLINIC | Age: 31
End: 2021-07-01

## 2021-07-01 SDOH — ECONOMIC STABILITY: FOOD INSECURITY: WITHIN THE PAST 12 MONTHS, THE FOOD YOU BOUGHT JUST DIDN'T LAST AND YOU DIDN'T HAVE MONEY TO GET MORE.: NEVER TRUE

## 2021-07-01 SDOH — ECONOMIC STABILITY: FOOD INSECURITY: HOW HARD IS IT FOR YOU TO PAY FOR THE VERY BASICS LIKE FOOD, HOUSING, MEDICAL CARE, AND HEATING?: NOT HARD AT ALL

## 2021-07-01 SDOH — ECONOMIC STABILITY: FOOD INSECURITY: WITHIN THE PAST 12 MONTHS, YOU WORRIED THAT YOUR FOOD WOULD RUN OUT BEFORE YOU GOT THE MONEY TO BUY MORE.: NEVER TRUE

## 2021-07-01 SDOH — ECONOMIC STABILITY: TRANSPORTATION INSECURITY: IN THE PAST 12 MONTHS, HAS LACK OF TRANSPORTATION KEPT YOU FROM MEDICAL APPOINTMENTS OR FROM GETTING MEDICATIONS?: NO

## 2021-07-01 SDOH — HEALTH STABILITY: MENTAL HEALTH
DO YOU FEEL STRESS - TENSE, RESTLESS, NERVOUS, OR ANXIOUS, OR UNABLE TO SLEEP AT NIGHT BECAUSE YOUR MIND IS TROUBLED ALL THE TIME - THESE DAYS?: NOT ASKED

## 2021-07-01 ASSESSMENT — ACTIVITIES OF DAILY LIVING (ADL)
LACK_OF_TRANSPORTATION: NO
DEPENDENT_IADLS:: INDEPENDENT

## 2021-07-01 NOTE — LETTER
[facility] CARE COORDINATION  [address] [place] [address]    July 1, 2021    Bib Cates  [address]  [facility] [address]      Dear Bib,    I am a clinic care coordinator who works with CASI Constantino CNP at [facility]. I wanted to thank you for spending the time to talk with me.  Below is a description of clinic care coordination and how I can further assist you.      The clinic care coordination team is made up of a registered nurse,  and community health worker who understand the health care system. The goal of clinic care coordination is to help you manage your health and improve access to the health care system in the most efficient manner. The team can assist you in meeting your health care goals by providing education, coordinating services, strengthening the communication among your providers and supporting you with any resource needs.    Please feel free to contact me at [phone] with any questions or concerns. We are focused on providing you with the highest-quality healthcare experience possible and that all starts with you.     Sincerely,     BRANDON Argueta      Enclosed: I have enclosed a copy of the Complex Care Plan. This has helpful information and goals that we have talked about. Please keep this in an easy to access place to use as needed.      [facility]  [email or web address]

## 2021-07-01 NOTE — LETTER
[facility]  Complex Care Plan  About Me:    Patient Name:  Bib Cates    YOB: 1990  Age:         30 year old   Raul MRN:    [id number] Telephone Information:  Home Phone [phone]   Mobile [phone]       Address:  [address]  [facility] [address] Email address:  [email or web address]      Emergency Contact(s)    Name Relationship Lgl Grd Work Phone Home Phone Mobile Phone   [place] BLACK Father   [phone]        Health Maintenance  Health Maintenance Reviewed: Up to date    My Access Plan  Medical Emergency 911   Primary Clinic Line [facility] - [phone]   24 Hour Appointment Line [phone] or  [phone]) (toll-free)   24 Hour Nurse Line [phone] (toll-free)   Preferred Urgent Care Other   Preferred Hospital [facility]  [phone]   Preferred Pharmacy [id number] - [place], [place] - [address] [place] [address]     Behavioral Health Crisis Line The National Suicide Prevention Lifeline at 1-409.856.9814 or 911       My Care Team Members  Patient Care Team       Relationship Specialty Notifications Start End    Areli Ulrich APRN CNP PCP - General Nurse Practitioner - Family  1/17/18     Phone: [phone] Fax: [phone] [address]  [facility] [address]    Areli Ulrich APRN CNP Referring Physician Nurse Practitioner - Family  9/16/19     [facility]    Phone: [phone] Fax: [phone] [address]  [facility] [address]    Lopez Teixeira MD MD Dermatology  9/16/19     Phone: [phone] Fax: [phone]         [address] [facility] [address]    Areli Ulrich APRN CNP Assigned PCP   1/31/21     Phone: [phone] Fax: [phone] [address]  [facility] [address]    Saritha Lenz, [facility] Lead Care  Coordinator Primary Care - CC Admissions 7/1/21     Marcy Connell Community Health Worker  Admissions 7/1/21             My Care Plans  Self Management and Treatment Plan  Goals and (Comments)  Goals        General    1. Psychosocial (pt-stated)     Notes - Note edited  7/1/2021 11:15 AM by Saritha Lenz LGSW    Goal Statement: I will apply for health insurance within the next 1-2 weeks if I am eligible.   Date Goal Set:  7/1/2021  Strengths:  motivated to enroll in health insurance  Barriers: none  Date to Achieve By: 8/1/2021  Patient expressed understanding of goal: yes  Action steps to achieve this goal:  1. I understand a referral was placed to the Financial Resource Worker, I will receive a call within the next 3 business days.    2. I understand the financial worker will make two attempts to call me. If I still need help with this goal, I will connect with my Community Health Worker Marcy at [phone].                       My Medical and Care Information  Problem List   Patient Active Problem List   Diagnosis     Diarrhea, unspecified type     Adjustment disorder with mixed anxiety and depressed mood     Unprotected sexual intercourse     Cocaine abuse (H)          Care Coordination Start Date: 7/1/2021   Frequency of Care Coordination: monthly   Form Last Updated: 07/01/2021

## 2021-07-01 NOTE — PROGRESS NOTES
Clinic Care Coordination Contact    Clinic Care Coordination Contact  OUTREACH    Referral Information:  Referral Source: PCP    Primary Diagnosis: Psychosocial    Chief Complaint   Patient presents with     Clinic Care Coordination - Initial        Universal Utilization:   Clinic Utilization  Difficulty keeping appointments:: No  Compliance Concerns: No  No-Show Concerns: No  No PCP office visit in Past Year: No  Utilization    Last refreshed: 6/30/2021  2:48 PM: Hospital Admissions 0           Last refreshed: 6/30/2021  2:48 PM: ED Visits 0           Last refreshed: 6/30/2021  2:48 PM: No Show Count (past year) 0              Current as of: 6/30/2021  2:48 PM            Clinical Concerns:  CC SW spoke with pt regarding his overall wellbeing. Pt shared that he is struggling with his mental health and would like to speak with a therapist but he doesn't have insurance. CC SW shared [facility] as a free therapy option.    FRW referral was placed to discuss pt's eligibility for MA and assistance with application. Pt shared he is working as a  but his hours are inconsistent.    Current Medical Concerns:  none    Current Behavioral Concerns: depression    Education Provided to patient: CC role   Pain  Pain (GOAL):: No  Health Maintenance Reviewed: Up to date  Clinical Pathway: None    Medication Management:  Not discussed at this time     Functional Status:  Dependent ADLs:: Independent  Dependent IADLs:: Independent  Bed or wheelchair confined:: No  Mobility Status: Independent  Fallen 2 or more times in the past year?: No  Any fall with injury in the past year?: No    Living Situation:  Current living arrangement:: I live alone  Type of residence:: Apartment    Lifestyle & Psychosocial Needs:     Social Needs     Financial resource strain: Not hard at all     Food insecurity     Worry: Never true     Inability: Never true     Transportation needs     Medical: No     Non-medical: No     Diet::  Regular  Inadequate nutrition (GOAL):: No  Tube Feeding: No  Inadequate activity/exercise (GOAL):: No  Significant changes in sleep pattern (GOAL): No  Transportation means:: Regular car     Episcopalian or spiritual beliefs that impact treatment:: No  Mental health DX:: Yes  Mental health DX how managed:: Medication  Mental health management concern (GOAL):: No  Chemical Dependency Status: No Current Concerns  Informal Support system:: Friends   Socioeconomic History     Marital status: Single     Spouse name: Not on file     Number of children: Not on file     Years of education: Not on file     Highest education level: Not on file     Tobacco Use     Smoking status: Never Smoker     Smokeless tobacco: Never Used   Substance and Sexual Activity     Alcohol use: Yes     Comment: 5/week     Drug use: Yes     Types: Cocaine, MDMA (Ecstasy)     Sexual activity: Yes     Partners: Female     Birth control/protection: Condom      Resources and Interventions:  Current Resources:    Community Resources: None  Supplies used at home:: None  Equipment Currently Used at Home: none  Employment Status: employed full-time)   )    Advance Care Plan/Directive  Advanced Care Plans/Directives on file:: No    Referrals Placed: Mental Health, County Resources     Goals:   Goals        General    1. Psychosocial (pt-stated)     Notes - Note edited  7/1/2021 11:15 AM by Saritha Lenz LGSW    Goal Statement: I will apply for health insurance within the next 1-2 weeks if I am eligible.   Date Goal Set:  7/1/2021  Strengths:  motivated to enroll in health insurance  Barriers: none  Date to Achieve By: 8/1/2021  Patient expressed understanding of goal: yes  Action steps to achieve this goal:  1. I understand a referral was placed to the Financial Resource Worker, I will receive a call within the next 3 business days.    2. I understand the financial worker will make two attempts to call me. If I still need help with this goal, I will  connect with my Community Health Worker Marcy at [phone].                 Patient/Caregiver understanding: Pt reports understanding and denies any additional questions or concerns at this times. SW CC engaged in AIDET communication during encounter.    Outreach Frequency: monthly  Future Appointments              In 1 week Areli Ulrich APRN CNP [facility]        Plan: CC SW will outreach to pt after he has spoken with FRW to determine if he has ongoing needs.    Kaylin Lenz [facility]  Clinic Care Coordinator  [facility] [place]  [facility]  [phone]  [email or web address]          Program Interested In:    County Benefits:  Is patient requesting an increase in SNAP/CASH? No    Are you due for a renewal or did you have an income change or household change? If yes, then send a referral to FRW. If no, then unable to increase benefits at this time.     Is patient requesting help applying for SNAP/CASH? NO   Yes- Answer the screening questions below    Screening Questions:  1. Have you recently applied for any county benefits? If so, what and when?  2. How many people in your household?  3. Do you buy/eat food together?   4. What is the monthly gross income for the household (wages, social security, workers comp, and pension)?    Insurance:  Is patient requesting help applying for insurance? YES   Yes- Check mn-its first and then answer the screening questions below   **If pt needs help with a renewal, you do not need to ask the questions below. Send referral to FRW**    Mn-its outcome (insert mn-its here):   (Make sure to enter social security#, PMI#, and change date of service to the year before if needed) No insurace    Screening Questions:   1. Have you recently applied for insurance or do for a renewal? If so, when? NO  2. How many people in your household? 1  3.  Do you file taxes, who do you claim? myself  5. What is the monthly gross income for the household (wages, social security, workers comp, and pension)? $2200        Any other information for FRW?       CHW will tell patient:   Thank you for answering all the questions, based on screening questions, our Financial Resource Worker will reach out to you with additional questions and next steps

## 2021-07-02 ENCOUNTER — TELEPHONE (OUTPATIENT)
Dept: BEHAVIORAL HEALTH | Facility: CLINIC | Age: 31
End: 2021-07-02

## 2021-07-02 NOTE — TELEPHONE ENCOUNTER
[facility] called patient per PCP request. Patient is interested in starting therapy however, he does not have insurance at this time. He was given resources for free therapy by Care Coordination. Patient will be meeting with Care Coordination in a few days to apply for insurance. Patient requested a call from [facility] in 2 weeks to check in and schedule a session.     Patient reports improvement in self harm urges. He denies intent or plan. He denies SI. Provided patient with COPE# [phone]. Patient agreed to call if symptoms worsen.

## 2021-07-06 ENCOUNTER — PATIENT OUTREACH (OUTPATIENT)
Dept: CARE COORDINATION | Facility: CLINIC | Age: 31
End: 2021-07-06

## 2021-07-06 NOTE — PROGRESS NOTES
Clinic Care Coordination Contact  Program: Health Insurance  County: [facility] Case #:  [facility] Worker:   [place]baldo #:   Subscriber #:   Renewal:  Date Applied:     FRW Outreach:  7/6/2021: FRW called patient and we went through the MNsure screening. Patient is aware he will most likely need to purchase a plan and would like to apply. We scheduled a phone appointment for 7/13/21 at 10:00 am. FRW will make outreach at that time.     Health Insurance Screening: MNSURE   1. Do you currently have health insurance, did you receive a renewal? None  2. If you applied through Mnsure, do you know your username/password? Maybe  3. How many people in the household? 1  4. Do you file taxes, who do you file with? Yes, self only  5. What is your monthly income (include all tax members)? $15, 30 hrs per wk + $1,200-1,500 in tips every 2 weeks  6. Do you have access to insurance through an employer (if yes, need EIN)? No  7. Do you have social security cards and/or green cards?  citizen       Health Insurance:    None    Referral/Screening:     Insurance:  Is patient requesting help applying for insurance? YES     Screening Questions:   1. Have you recently applied for insurance or do for a renewal? If so, when? NO  2. How many people in your household? 1  3. Do you file taxes, who do you claim? myself  5. What is the monthly gross income for the household (wages, social security, workers comp, and pension)? $2200    Financial Resource Worker Follow Up    Goals:   Goals Addressed as of 7/6/2021 at 9:52 AM                 Today      1. Psychosocial (pt-stated)   20%    Added 7/1/21 by Saritha Lenz LGSW     Goal Statement: I will apply for health insurance within the next 1-2 weeks if I am eligible.   Date Goal Set:  7/1/2021  Strengths:  motivated to enroll in health insurance  Barriers: none  Date to Achieve By: 8/1/2021  Patient expressed understanding of goal: yes  Action steps to achieve this goal:  1. I understand a  referral was placed to the Financial Resource Worker, I will receive a call within the next 3 business days.    2. I understand the financial worker will make two attempts to call me. If I still need help with this goal, I will connect with my Community Health Worker Marcy at [phone].                   Intervention and Education during outreach: n/a    FRW Plan: FRW will make outreach 7/13/21 at 10:00 am

## 2021-07-12 ENCOUNTER — VIRTUAL VISIT (OUTPATIENT)
Dept: FAMILY MEDICINE | Facility: CLINIC | Age: 31
End: 2021-07-12

## 2021-07-12 DIAGNOSIS — F10.10 ALCOHOL ABUSE, EPISODIC DRINKING BEHAVIOR: ICD-10-CM

## 2021-07-12 DIAGNOSIS — F32.1 MODERATE MAJOR DEPRESSION (H): Primary | ICD-10-CM

## 2021-07-12 DIAGNOSIS — L02.92 BOIL: ICD-10-CM

## 2021-07-12 DIAGNOSIS — F41.1 GAD (GENERALIZED ANXIETY DISORDER): ICD-10-CM

## 2021-07-12 PROCEDURE — 96127 BRIEF EMOTIONAL/BEHAV ASSMT: CPT | Mod: 95 | Performed by: NURSE PRACTITIONER

## 2021-07-12 PROCEDURE — 99215 OFFICE O/P EST HI 40 MIN: CPT | Mod: 95 | Performed by: NURSE PRACTITIONER

## 2021-07-12 RX ORDER — BUSPIRONE HYDROCHLORIDE 5 MG/1
5 TABLET ORAL 2 TIMES DAILY
Qty: 90 TABLET | Refills: 1 | Status: SHIPPED | OUTPATIENT
Start: 2021-07-12 | End: 2021-07-12

## 2021-07-12 ASSESSMENT — ANXIETY QUESTIONNAIRES
2. NOT BEING ABLE TO STOP OR CONTROL WORRYING: NOT AT ALL
3. WORRYING TOO MUCH ABOUT DIFFERENT THINGS: NOT AT ALL
IF YOU CHECKED OFF ANY PROBLEMS ON THIS QUESTIONNAIRE, HOW DIFFICULT HAVE THESE PROBLEMS MADE IT FOR YOU TO DO YOUR WORK, TAKE CARE OF THINGS AT HOME, OR GET ALONG WITH OTHER PEOPLE: SOMEWHAT DIFFICULT
7. FEELING AFRAID AS IF SOMETHING AWFUL MIGHT HAPPEN: SEVERAL DAYS
GAD7 TOTAL SCORE: 5
1. FEELING NERVOUS, ANXIOUS, OR ON EDGE: SEVERAL DAYS
5. BEING SO RESTLESS THAT IT IS HARD TO SIT STILL: MORE THAN HALF THE DAYS
6. BECOMING EASILY ANNOYED OR IRRITABLE: NOT AT ALL

## 2021-07-12 ASSESSMENT — PATIENT HEALTH QUESTIONNAIRE - PHQ9
SUM OF ALL RESPONSES TO PHQ QUESTIONS 1-9: 7
5. POOR APPETITE OR OVEREATING: SEVERAL DAYS

## 2021-07-12 NOTE — PROGRESS NOTES
"Jaziel is a 30 year old who is being evaluated via a billable video visit.      How would you like to obtain your AVS? MyChart  If the video visit is dropped, the invitation should be resent by: Text to cell phone: [phone]  Will anyone else be joining your video visit? No    Video Start Time: 10:25 AM    Assessment & Plan     Moderate major depression (H)  Slightly improving, pt worried he might be \"in honeymoon period\" after a med change, start counseling asap   SI passive, contracted for safety see below     BRITTANY (generalized anxiety disorder)  Discussed titrating up buspar to get better control of anxiety and panic  Continue working on good self care and limiting alcohol use    Boil  See patient instructions, send my chart pic if changes or any concerns, follow up if not resolving after warm compresses    Alcohol abuse, episodic drinking behavior   he is not clear at this point if needs complete sobriety, past history of cocaine use, discussed slippery slope and nature of addicitons  Start counseling, keep notes if uses for social anxiety, may need tretament program if cannot do on his own         BMI:   Estimated body mass index is 27.24 kg/m  as calculated from the following:    Height as of 1/20/21: 1.76 m (5' 9.29\").    Weight as of 1/20/21: 84.4 kg (186 lb).   Weight management plan: Discussed healthy diet and exercise guidelines    Depression Screening Follow Up    PHQ 7/12/2021   PHQ-9 Total Score 7   Q9: Thoughts of better off dead/self-harm past 2 weeks Several days     Last PHQ-9 7/12/2021   1.  Little interest or pleasure in doing things 0   2.  Feeling down, depressed, or hopeless 1   3.  Trouble falling or staying asleep, or sleeping too much 2   4.  Feeling tired or having little energy 0   5.  Poor appetite or overeating 0   6.  Feeling bad about yourself 1   7.  Trouble concentrating 2   8.  Moving slowly or restless 0   Q9: Thoughts of better off dead/self-harm past 2 weeks 1   PHQ-9 Total " Score 7   Difficulty at work, home, or with people Somewhat difficult     BRITTANY-7 SCORE 7/17/2019 2/22/2021 7/12/2021   Total Score 4 6 5             Follow Up      Follow Up Actions Taken  Crisis resource information provided in the After Visit Summary  Patient to follow up with PCP.  Clinic staff to schedule appointment if able.  Mental Health Referral placed    Discussed the following ways the patient can remain in a safe environment:  be around others and schedule counseling, passive SI/ obtrusive thoughts no plan  Regular exercise  See Patient Instructions    No follow-ups on file.    CAIS Constantino [facility] MEGA Sheriff is a 30 year old who presents for the following health issues     HPI     Depression and Anxiety Follow-Up    How are you doing with your depression since your last visit? Improved     How are you doing with your anxiety since your last visit?  Improved     Are you having other symptoms that might be associated with depression or anxiety? Yes:  Headaches- 1-2 headaches weekly    Have you had a significant life event? No     Do you have any concerns with your use of alcohol or other drugs? Yes:  Alcohol    Social History     Tobacco Use     Smoking status: Never Smoker     Smokeless tobacco: Never Used   Substance Use Topics     Alcohol use: Yes     Comment: 5/week     Drug use: Yes     Types: Cocaine, MDMA (Ecstasy)     PHQ 7/17/2019 2/22/2021 7/12/2021   PHQ-9 Total Score 10 10 7   Q9: Thoughts of better off dead/self-harm past 2 weeks Several days Several days Several days     BRITTANY-7 SCORE 7/17/2019 2/22/2021 7/12/2021   Total Score 4 6 5     Last PHQ-9 7/12/2021   1.  Little interest or pleasure in doing things 0   2.  Feeling down, depressed, or hopeless 1   3.  Trouble falling or staying asleep, or sleeping too much 2   4.  Feeling tired or having little energy 0   5.  Poor appetite or overeating 0   6.  Feeling bad about yourself 1   7.   Trouble concentrating 2   8.  Moving slowly or restless 0   Q9: Thoughts of better off dead/self-harm past 2 weeks 1   PHQ-9 Total Score 7   Difficulty at work, home, or with people Somewhat difficult     BRITTANY-7  7/12/2021   1. Feeling nervous, anxious, or on edge 1   2. Not being able to stop or control worrying 0   3. Worrying too much about different things 0   4. Trouble relaxing 1   5. Being so restless that it is hard to sit still 2   6. Becoming easily annoyed or irritable 0   7. Feeling afraid, as if something awful might happen 1   BRITTANY-7 Total Score 5   If you checked any problems, how difficult have they made it for you to do your work, take care of things at home, or get along with other people? Somewhat difficult     Getting up earlier which he likes,   Working a lot   Suicide Assessment Five-step Evaluation and Treatment (SAFE-T)      How many servings of fruits and vegetables do you eat daily?  2-3    On average, how many sweetened beverages do you drink each day (Examples: soda, juice, sweet tea, etc.  Do NOT count diet or artificially sweetened beverages)?   0    How many days per week do you exercise enough to make your heart beat faster? 7    How many minutes a day do you exercise enough to make your heart beat faster? 30 - 60    How many days per week do you miss taking your medication? 0    Sil the financial person gave him resources   COPE Etc really nice  [facility] reached out     Feels like it sometimes is a Honeymoon period with meds so doesn't want to get too excited but does feel better  buspar 5 mg twice a day     Cutting down on alcohol  Hard not drinking around friends   Friend's birthday drank and didn't get sad drunk  Favorite DJ went alone and had some alcohol there     Also wants to talk about infected hair on left leg, thigh, for the past day red and surrounding redness today   Thinks scratched it in his sleep, no tick or bug bite, hurtsmildly a little itching   Hasn't done  anything for this yet      See above     Review of Systems   Constitutional, HEENT, cardiovascular, pulmonary, GI, , musculoskeletal, neuro, skin, endocrine and psych systems are negative, except as otherwise noted.      Objective           Vitals:  No vitals were obtained today due to virtual visit.    Physical Exam   GENERAL: Healthy, alert and no distress  EYES: Eyes grossly normal to inspection.  No discharge or erythema, or obvious scleral/conjunctival abnormalities.  RESP: No audible wheeze, cough, or visible cyanosis.  No visible retractions or increased work of breathing.    SKIN: left inner anterior thigh with small scab mild induration and erythematous surrounding approx 2-3 cm around, no drng per pt not hot n ofevers  NEURO: Cranial nerves grossly intact.  Mentation and speech appropriate for age.  PSYCH: Mentation appears normal, affect normal/bright, judgement and insight intact, normal speech and appearance well-groomed.                Video-Visit Details    Type of service:  Video Visit    Video End Time:10:46 AM    Originating Location (pt. Location): Home    Distant Location (provider location):  [facility]     Platform used for Video Visit: [email or web address]

## 2021-07-12 NOTE — PATIENT INSTRUCTIONS
Keep working on limiting alcohol and increase the buspirone to 10 mg (two tablets) at night, if you wish to increase to this does in morning you could too. This helps with anxiety and panic, sleep as well.     I like that you're working on a regular sleep schedule and good self care, keep that up as well!    Get started with counseling, and check in with me in a month or earlier if needed.     Warm pack the sore on your leg and use topical antibiotics ointment for a few days. Please notify me or send pic if not improving so we can discuss what to do next.     Take care!      Patient Education     Understanding Carbuncles    A carbuncle (CHD-kle-gxlo) is a painful boil under the skin. It happens when a group of hair follicles become infected. Follicles are the tiny holes from which hair grows out of your skin.  What causes carbuncles?  A carbuncle is caused by an infection with bacteria, usually Staphylococcus aureus. They are common on areas of the body where friction and sweat occur. They usually appear on the back of the neck, back, and thighs. This type of infection can also happen when the skin is injured, such as by a cut or bug bite.  The bacteria that causes carbuncles can spread easily from person to person. People at higher risk for boils are those with diabetes or a weak immune system. Drug users who use needles are also more likely to get them.  Symptoms of carbuncles  A carbuncle starts as a small painful bump. But it can grow quickly. It may become:    Red    Swollen    Tender  Carbuncles may ooze pus. They may also cause fever and a general feeling of illness.  Treatment for carbuncles  A carbuncle may heal on its own in a few weeks. But the pus within it needs to come out first. Treatment options include:    Warm compress. Putting a warm, wet washcloth on the boil will help the pus drain out. You should never try to pop a boil. That can cause the infection to spread.    Surgical drainage. If the boil  doesn t drain on its own, your healthcare provider may need to cut into it.    Antibiotics. In some cases, oral antibiotics may be prescribed to fight the infection, especially if the carbuncle returns. You will likely have to take the medicine for 5 to 7 days. You may need to take it longer for a severe case.    Good hygiene. Proper handwashing can prevent boils from spreading and coming back. Also wash things that have been in contact with the carbuncle in hot water. This includes items such as clothing and towels.  Complications of carbuncles  The main complication of a carbuncle is the spreading of the infection. The bacteria can infect the heart and bone. It can also lead to septic shock, an infection of the entire body.  When to call your healthcare provider  Call your healthcare provider right away if you have any of these:    Fever of 100.4 F (38 C) or higher, or as directed    Redness, swelling, or fluid leaking from a carbuncle that gets worse    Pain that gets worse    Symptoms that don t get better, or get worse    New symptoms  [employer] last reviewed this educational content on 6/1/2019 2000-2021 [employer]. All rights reserved. This information is not intended as a substitute for professional medical care. Always follow your healthcare professional's instructions.           Patient Education     Folliculitis  Folliculitis is an infection of a hair follicle. A hair follicle is the little pocket where a hair grows out of the skin. Bacteria normally live on the skin. But sometimes bacteria can get trapped in a follicle and cause infection. This causes a bumpy rash. The area over the follicles is red and raised. It may itch or be painful. The bumps may have fluid (pus) inside. The pus may leak and then form crusts. Sores can spread to other areas of the body. Once it goes away, folliculitis can come back at any time. Severe cases may cause lasting (permanent) hair loss and scarring.    Folliculitis can happen anywhere on the body where hair grows. It can be caused by rubbing from tight clothing. Ingrown hairs can cause it. Soaking in a hot tub or swimming pool that has bacteria in the water can cause it. It may also occur if a hair follicle is blocked by a bandage.   Sores often go away in a few days with no treatment. In some cases, medicine may be given. A small piece of skin or pus may be taken to find the type of bacteria causing the infection.   Home care  The healthcare provider may prescribe an antibiotic cream or ointment. Antibiotics taken by mouth (oral) may also be prescribed. Or you may be told to use an over-the-counter antibiotic cream. Follow all instructions when using any of these medicines.   General care    Apply warm, moist compresses to the sores for 20 minutes up to 3 times a day. You can make a compress by soaking a cloth in warm water. Squeeze out excess water.    Don t cut, poke, or squeeze the sores. This can be painful and spread infection.    Don t scratch the affected area. Scratching can delay healing.    Don t shave the areas affected by folliculitis.    If the sores leak fluid, cover the area with a nonstick gauze bandage. Use as little tape as possible. Carefully get rid of all soiled bandages.    Dress in loose cotton clothing.    Change sheets and blankets if they are soiled by pus. Wash all clothes, towels, sheets, and cloth diapers in soap and hot water. Don't share clothes, towels, or sheets with other family members.    Don't soak the sores in bath water. This can spread infection. Instead keep the area clean by gently washing sores with soap and warm water.    Wash your hands or use antibacterial gels often to prevent spreading the bacteria.    Follow-up care  Follow up with your healthcare provider, or as advised.  When to get medical advice  Call your healthcare provider right away if any of these occur:    Fever of 100.4 F (38 C) or higher, or as  directed by your provider    Rash spreads    Rash does not get better with treatment    Redness or swelling that gets worse    Rash becomes more painful    Foul-smelling fluid leaking from the skin    Rash improves, but then comes back   [employer] last reviewed this educational content on 8/1/2019 2000-2021 [employer]. All rights reserved. This information is not intended as a substitute for professional medical care. Always follow your healthcare professional's instructions.

## 2021-07-13 ENCOUNTER — APPOINTMENT (OUTPATIENT)
Dept: CARE COORDINATION | Facility: CLINIC | Age: 31
End: 2021-07-13

## 2021-07-13 ENCOUNTER — PATIENT OUTREACH (OUTPATIENT)
Dept: CARE COORDINATION | Facility: CLINIC | Age: 31
End: 2021-07-13

## 2021-07-13 ASSESSMENT — ANXIETY QUESTIONNAIRES: GAD7 TOTAL SCORE: 5

## 2021-07-13 NOTE — PROGRESS NOTES
Clinic Care Coordination Contact  Program: Health Insurance  County: [facility] Case #:  [facility] Worker:   Mnsure #:   Subscriber #:   Renewal:  Date Applied:     FRW Outreach:  7/13/2021: FRW called patient for our scheduled phone appointment and he states he just received a letter from [employer] regarding a life change event he reported but he states he hasn't reported anything. We called [employer] and talked to Ibis, she states he has an open case from 2019 with a QHP eligibility. Patient's income has changed since he applied last so he decided to close his case so we can re-apply with current info. We completed the online MNs[employer] application and patient is approved to purchase a QHP with a tax credit. FRW looked up a  in his area for him to contact if he has questions on the plans offered to him. FRW will remove patient from panel and resolve the FRW episode as no further FRW needs at this time.     Insurance Assistance hide details  Bib qualifies for financial assistance to buy a health plan through [employer].  Premium Tax Credit  $21 off/month  Cost-Sharing Reduction  Bib Cates - 0% Reduction    7/6/2021: FRW called patient and we went through the [employer] screening. Patient is aware he will most likely need to purchase a plan and would like to apply. We scheduled a phone appointment for 7/13/21 at 10:00 am. FRW will make outreach at that time.     Health Insurance Screening: MNSURE   1. Do you currently have health insurance, did you receive a renewal? None  2. If you applied through [employer], do you know your username/password? Maybe  3. How many people in the household? 1  4. Do you file taxes, who do you file with? Yes, self only  5. What is your monthly income (include all tax members)? $15, 30 hrs per wk + $1,200-1,500 in tips every 2 weeks= $1,780 bi-weekly  6. Do you have access to insurance through an employer (if yes, need EIN)? No  7. Do you have social security cards and/or green cards? [place]  citizen       Health Insurance:    None    Referral/Screening:     Insurance:  Is patient requesting help applying for insurance? YES     Screening Questions:   1. Have you recently applied for insurance or do for a renewal? If so, when? NO  2. How many people in your household? 1  3. Do you file taxes, who do you claim? myself  5. What is the monthly gross income for the household (wages, social security, workers comp, and pension)? $2200    Financial Resource Worker Follow Up    Goals:   Goals Addressed as of 7/13/2021 at 10:43 AM                    Today    7/6/21       1. Psychosocial (pt-stated)   100%  20%    Added 7/1/21 by Saritha Lenz, [facility]      Goal Statement: I will apply for health insurance within the next 1-2 weeks if I am eligible.   Date Goal Set:  7/1/2021  Strengths:  motivated to enroll in health insurance  Barriers: none  Date to Achieve By: 8/1/2021  Patient expressed understanding of goal: yes  Action steps to achieve this goal:  1. I understand a referral was placed to the Financial Resource Worker, I will receive a call within the next 3 business days.    2. I understand the financial worker will make two attempts to call me. If I still need help with this goal, I will connect with my Community Health Worker Marcy at [phone].                   Intervention and Education during outreach: n/a    JOSELIN Plan: n/a

## 2021-07-19 ENCOUNTER — TELEPHONE (OUTPATIENT)
Dept: BEHAVIORAL HEALTH | Facility: CLINIC | Age: 31
End: 2021-07-19
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
## 2021-08-02 ENCOUNTER — PATIENT OUTREACH (OUTPATIENT)
Dept: NURSING | Facility: CLINIC | Age: 31
End: 2021-08-02

## 2021-08-02 NOTE — PROGRESS NOTES
Clinic Care Coordination Contact    Follow Up Progress Note      Assessment: CC GRETCHEN spoke with pt regarding his goal with Care Coordination. Pt shared that he now has insurance through [facility], he received the letter yesterday.    Pt shared that there is no further needs for CC GRETCHEN at this time.    Plan: At this time, pt denies outstanding need for connection or referral to resources or assistance navigating recommended follow up care. No further outreaches will be made at this time unless a new referral is made or a change in the pt's status occurs. Patient was provided with CC GRETCHEN contact information and encouraged to call with any questions or concerns.    Kaylin Lenz [facility]  Clinic Care Coordinator  [facility]  [facility]  [phone]  [email or web address]

## 2021-08-02 NOTE — LETTER
[facility] CARE COORDIN[facility]  [address] [place] [address]    August 2, 2021    Bib Cates  [address]  [facility] [address]    Dear Bib,  Your Care Team congratulates you on your journey to maintain wellness. This document will help guide you on your journey to maintain a healthy lifestyle.  You can use this to help you overcome any barriers you may encounter.  If you should have any questions or concerns, you can contact the members of your Care Team or contact your Primary Care Clinic for assistance.     Health Maintenance  Health Maintenance Reviewed:      My Access Plan  Medical Emergency 911   Primary Clinic Line [facility] - [phone]   24 Hour Appointment Line [phone] or  [phone]) (toll-free)   24 Hour Nurse Line [phone] (toll-free)   Preferred Urgent Care     Preferred Hospital     Preferred Pharmacy [facility] [address] [facility] [employer] - [place], [place] - [address] [facility]     Behavioral Health Crisis Line The National Suicide Prevention Lifeline at 1-911.612.8338 or 911     My Care Team Members  Patient Care Team       Relationship Specialty Notifications Start End    Areli Ulrich APRN CNP PCP - General Nurse Practitioner - Family  1/17/18     Phone: [phone] Fax: [phone]         [address] [address]    Areli Ulrich APRN CNP Referring Physician Nurse Practitioner - Family  9/16/19     Referred to  of  Allergy Clinic    Phone: [phone] Fax: [phone]         [address] [facility] [address] [facility] [address]    Lopez Teixeira MD MD Dermatology  9/16/19     Phone: [phone] Fax: [phone]         [address] [facility] [address]    Areli Ulrich APRN CNP Assigned PCP   1/31/21     Phone: [phone] Fax: [phone]         [address] [address]              Goals        Patient Stated       COMPLETED: 1. Psychosocial  (pt-stated)       Goal Statement: I will apply for health insurance within the next 1-2 weeks if I am eligible.   Date Goal Set:  7/1/2021  Strengths:  motivated to enroll in health insurance  Barriers: none  Date to Achieve By: 8/1/2021  Patient expressed understanding of goal: yes  Action steps to achieve this goal:  1. I understand a referral was placed to the Financial Resource Worker, I will receive a call within the next 3 business days.    2. I understand the financial worker will make two attempts to call me. If I still need help with this goal, I will connect with my Community Health Worker Marcy at [phone].                    It has been your Clinic Care Team's pleasure to work with you on your goals.    Regards,    Kaylin Lenz, [facility]  Clinic Care Coordinator  [phone]

## 2021-08-10 DIAGNOSIS — F43.23 ADJUSTMENT DISORDER WITH MIXED ANXIETY AND DEPRESSED MOOD: ICD-10-CM

## 2021-08-10 NOTE — TELEPHONE ENCOUNTER
Reason for Call:  Medication or medication refill:    Do you use a [facility]?  Name of the pharmacy and phone number for the current request:  [facility] [id number] [place] [facility] - [place], [place] - [facility] [facility]    Name of the medication requested: buPROPion (WELLBUTRIN XL) 300 MG 24 hr tablet    Other request: NA    Can we leave a detailed message on this number? Not Applicable    Phone number patient can be reached at: Other phone number: PHARMACY: [phone]    Call taken on 8/10/2021 at 3:50 PM by Nohemy Soriano       No

## 2021-08-12 RX ORDER — BUPROPION HYDROCHLORIDE 300 MG/1
300 TABLET ORAL EVERY MORNING
Qty: 90 TABLET | Refills: 0 | Status: SHIPPED | OUTPATIENT
Start: 2021-08-12 | End: 2022-03-09

## 2021-08-12 NOTE — TELEPHONE ENCOUNTER
Pt had 90 day supply with 1 refill sent to pharmacy in [place] on 6/28/21 - now requesting refill to pharmacy in [place]. Approving refill request as likely pt was unable to transfer rx.    Briana Zayas RN  [facility]

## 2021-09-13 ENCOUNTER — VIRTUAL VISIT (OUTPATIENT)
Dept: FAMILY MEDICINE | Facility: CLINIC | Age: 31
End: 2021-09-13
Payer: COMMERCIAL

## 2021-09-13 DIAGNOSIS — B35.6 TINEA INGUINALIS: Primary | ICD-10-CM

## 2021-09-13 DIAGNOSIS — Z91.09 ENVIRONMENTAL ALLERGIES: ICD-10-CM

## 2021-09-13 PROCEDURE — 99202 OFFICE O/P NEW SF 15 MIN: CPT | Mod: 95 | Performed by: NURSE PRACTITIONER

## 2021-09-13 RX ORDER — CETIRIZINE HYDROCHLORIDE 10 MG/1
10 TABLET ORAL DAILY
Qty: 90 TABLET | Refills: 3 | Status: SHIPPED | OUTPATIENT
Start: 2021-09-13 | End: 2022-03-09

## 2021-09-13 RX ORDER — FLUCONAZOLE 150 MG/1
150 TABLET ORAL
Qty: 4 TABLET | Refills: 0 | Status: SHIPPED | OUTPATIENT
Start: 2021-09-13 | End: 2021-10-05

## 2021-09-13 NOTE — PROGRESS NOTES
Jaziel is a 30 year old who is being evaluated via a billable video visit.      How would you like to obtain your AVS? MyChart  If the video visit is dropped, the invitation should be resent by: Text to cell phone: [phone]   Will anyone else be joining your video visit? No      Video Start Time: 9:40 am    Assessment & Plan   Problem List Items Addressed This Visit     None      Visit Diagnoses     Tinea inguinalis    -  Primary    Relevant Medications    fluconazole (DIFLUCAN) 150 MG tablet    cetirizine (ZYRTEC) 10 MG tablet    Environmental allergies        Relevant Medications    cetirizine (ZYRTEC) 10 MG tablet             15 minutes spent on the date of the encounter doing chart review, history and exam, documentation and further activities per the note       See Patient Instructions    No follow-ups on file.    [facility]    Yash Sheriff is a 30 year old who presents for the following health issues   Gets a jock-itch every summer; has tried Clotrimazole Cream and terbinafine cream as well as nystatin powder with no relief. The rash is red and scaly, but no other bumps, no drainage; no pain. It is located in the groin area and has not spread to thighs or buttucks.      HPI     Possible jock itch  Pt states he has had this before.        Review of Systems   Constitutional, HEENT, cardiovascular, pulmonary, gi and gu systems are negative, except as otherwise noted.      Objective           Vitals:  No vitals were obtained today due to virtual visit.    Physical Exam   GENERAL: Healthy, alert and no distress  EYES: Eyes grossly normal to inspection.  No discharge or erythema, or obvious scleral/conjunctival abnormalities.  RESP: No audible wheeze, cough, or visible cyanosis.  No visible retractions or increased work of breathing.    NEURO: Cranial nerves grossly intact.  Mentation and speech appropriate for age.  PSYCH: Mentation appears normal, affect  normal/bright, judgement and insight intact, normal speech and appearance well-groomed.    Office Visit on 10/08/2018   Component Date Value Ref Range Status     Specimen Descrip 10/08/2018 Urine   Final     N Gonorrhea PCR 10/08/2018 Negative  NEG^Negative Final    Comment: Negative for N. gonorrhoeae rRNA by transcription mediated amplification.  A negative result by transcription mediated amplification does not preclude   the presence of N. gonorrhoeae infection because results are dependent on   proper and adequate collection, absence of inhibitors, and sufficient rRNA to   be detected.       Specimen Description 10/08/2018 Urine   Final     Chlamydia Trachomatis PCR 10/08/2018 Negative  NEG^Negative Final    Comment: Negative for C. trachomatis rRNA by transcription mediated amplification.  A negative result by transcription mediated amplification does not preclude   the presence of C. trachomatis infection because results are dependent on   proper and adequate collection, absence of inhibitors, and sufficient rRNA to   be detected.       HIV Antigen Antibody Combo 10/08/2018 Nonreactive  NR^Nonreactive     Final    HIV-1 p24 Ag & HIV-1/HIV-2 Ab Not Detected     Treponema Antibodies 10/08/2018 Nonreactive  NR^Nonreactive Final     Cholesterol 10/08/2018 172  <200 mg/dL Final     Triglycerides 10/08/2018 72  <150 mg/dL Final     HDL Cholesterol 10/08/2018 55  >39 mg/dL Final     LDL Cholesterol Calculated 10/08/2018 103* <100 mg/dL Final    Comment: Above desirable:  100-129 mg/dl  Borderline High:  130-159 mg/dL  High:             160-189 mg/dL  Very high:       >189 mg/dl       Non HDL Cholesterol 10/08/2018 117  <130 mg/dL Final     Glucose 10/08/2018 81  70 - 99 mg/dL Final         Video-Visit Details    Type of service:  Video Visit    Video End Time:9:51 am    Originating Location (pt. Location): Home    Distant Location (provider location):  [facility]     Platform used for Video  Visit: Woody

## 2021-10-10 ENCOUNTER — HEALTH MAINTENANCE LETTER (OUTPATIENT)
Age: 31
End: 2021-10-10

## 2021-10-19 PROBLEM — F32.9 MAJOR DEPRESSION: Status: ACTIVE | Noted: 2021-07-12

## 2021-12-17 ENCOUNTER — E-VISIT (OUTPATIENT)
Dept: FAMILY MEDICINE | Facility: CLINIC | Age: 31
End: 2021-12-17
Payer: COMMERCIAL

## 2021-12-17 DIAGNOSIS — H01.119: ICD-10-CM

## 2021-12-17 DIAGNOSIS — Z91.09 ENVIRONMENTAL ALLERGIES: ICD-10-CM

## 2021-12-17 DIAGNOSIS — L30.9 DERMATITIS: Primary | ICD-10-CM

## 2021-12-17 PROCEDURE — 99421 OL DIG E/M SVC 5-10 MIN: CPT | Performed by: NURSE PRACTITIONER

## 2021-12-17 RX ORDER — HYDROXYZINE HYDROCHLORIDE 25 MG/1
25 TABLET, FILM COATED ORAL EVERY 8 HOURS PRN
Qty: 90 TABLET | Refills: 0 | Status: SHIPPED | OUTPATIENT
Start: 2021-12-17 | End: 2022-01-11

## 2021-12-17 RX ORDER — PREDNISONE 20 MG/1
40 TABLET ORAL EVERY MORNING
Qty: 10 TABLET | Refills: 0 | Status: SHIPPED | OUTPATIENT
Start: 2021-12-17 | End: 2021-12-22

## 2021-12-17 NOTE — TELEPHONE ENCOUNTER
Provider E-Visit time total (minutes): 6    Sent message to pt and will clarify if he is wanting referral to specialist if this doesn't improve.     Hydroxyzine added for itching and prednisone course since area is around eyes will treat with po med.

## 2022-01-11 DIAGNOSIS — L30.9 DERMATITIS: ICD-10-CM

## 2022-01-11 RX ORDER — HYDROXYZINE HYDROCHLORIDE 25 MG/1
25 TABLET, FILM COATED ORAL EVERY 8 HOURS PRN
Qty: 90 TABLET | Refills: 3 | Status: SHIPPED | OUTPATIENT
Start: 2022-01-11 | End: 2022-03-09

## 2022-01-11 NOTE — TELEPHONE ENCOUNTER
"Requested Prescriptions   Signed Prescriptions Disp Refills    hydrOXYzine (ATARAX) 25 MG tablet 90 tablet 3     Sig: Take 1 tablet (25 mg) by mouth every 8 hours as needed for itching       Antihistamines Protocol Passed - 1/11/2022 10:41 AM        Passed - Recent (12 mo) or future (30 days) visit within the authorizing provider's specialty     Patient has had an office visit with the authorizing provider or a provider within the authorizing providers department within the previous 12 mos or has a future within next 30 days. See \"Patient Info\" tab in inbasket, or \"Choose Columns\" in Meds & Orders section of the refill encounter.              Passed - Patient is age 3 or older     Apply age and/or weight-based dosing for peds patients age 3 and older.    Forward request to provider for patients under the age of 3.          Passed - Medication is active on med list           Briana Zayas RN  [facility]    "

## 2022-01-11 NOTE — TELEPHONE ENCOUNTER
Reason for Call:  Medication or medication refill:    Do you use a [facility]?  Name of the pharmacy and phone number for the current request:  [facility] [id number] [place] [facility] - [place], [place] - [address]    Name of the medication requested: hydrOXYzine (ATARAX) 25 MG tablet    Other request: N/A    Can we leave a detailed message on this number? YES    Phone number patient can be reached at: Home number on file [phone] (home)    Best Time: Any    Call taken on 1/11/2022 at 10:40 AM by Tiffany Dick

## 2022-01-24 ENCOUNTER — MYC MEDICAL ADVICE (OUTPATIENT)
Dept: FAMILY MEDICINE | Facility: CLINIC | Age: 32
End: 2022-01-24
Payer: COMMERCIAL

## 2022-01-24 DIAGNOSIS — H01.119: Primary | ICD-10-CM

## 2022-01-28 RX ORDER — PREDNISONE 20 MG/1
20 TABLET ORAL 2 TIMES DAILY
Qty: 10 TABLET | Refills: 0 | Status: SHIPPED | OUTPATIENT
Start: 2022-01-28 | End: 2022-02-02

## 2022-01-28 RX ORDER — DESONIDE 0.5 MG/G
CREAM TOPICAL 2 TIMES DAILY
Qty: 15 G | Refills: 0 | Status: SHIPPED | OUTPATIENT
Start: 2022-01-28 | End: 2022-03-09

## 2022-02-22 ENCOUNTER — OFFICE VISIT (OUTPATIENT)
Dept: ALLERGY | Facility: CLINIC | Age: 32
End: 2022-02-22
Payer: COMMERCIAL

## 2022-02-22 DIAGNOSIS — Z91.09 ENVIRONMENTAL ALLERGIES: ICD-10-CM

## 2022-02-22 DIAGNOSIS — H10.10 SEASONAL AND PERENNIAL ALLERGIC RHINOCONJUNCTIVITIS: ICD-10-CM

## 2022-02-22 DIAGNOSIS — J30.2 SEASONAL AND PERENNIAL ALLERGIC RHINOCONJUNCTIVITIS: ICD-10-CM

## 2022-02-22 DIAGNOSIS — H01.119: ICD-10-CM

## 2022-02-22 DIAGNOSIS — L20.89 OTHER ATOPIC DERMATITIS: Primary | ICD-10-CM

## 2022-02-22 DIAGNOSIS — J30.89 SEASONAL AND PERENNIAL ALLERGIC RHINOCONJUNCTIVITIS: ICD-10-CM

## 2022-02-22 PROCEDURE — 99214 OFFICE O/P EST MOD 30 MIN: CPT | Performed by: DERMATOLOGY

## 2022-02-22 RX ORDER — HYDROXYZINE HYDROCHLORIDE 50 MG/1
50 TABLET, FILM COATED ORAL 3 TIMES DAILY PRN
COMMUNITY
End: 2022-03-09

## 2022-02-22 RX ORDER — PIMECROLIMUS 10 MG/G
CREAM TOPICAL 2 TIMES DAILY
Qty: 30 G | Refills: 1 | Status: SHIPPED | OUTPATIENT
Start: 2022-02-22 | End: 2023-02-22

## 2022-02-22 ASSESSMENT — PAIN SCALES - GENERAL: PAINLEVEL: NO PAIN (0)

## 2022-02-22 NOTE — LETTER
2/22/2022         RE: Bib Cates  [address]  [facility] [id number]        Dear Colleague,    Thank you for referring your patient, Bib Cates, to the [facility] [place]. Please see a copy of my visit note below.    [facility] Dermato-allergology Note  Office visit  Encounter Date: Feb 22, 2022  ____________________________________________    CC: No chief complaint on file.      HPI:  (02/21/22)  Mr. Bib Cates is a(n) 31 year old male who presents today as a return patient for allergy consultation  - last seen 09/20/2019  - another angioedema in September 2021. Patinent was asleep, around 2pm and felt something crawling on the hands, smacked it and washed the hands. Lay down in couch with open window and in the morning red eyes with periorbital swellling (but not lips and not breathing problems) --> maybe exposure to HDM or cat allergen on couch or ragweed pollen from outside  >> winter 2020 November/December eyelids very dry and itchy and flaky (in photos from December 2020 lichenoid dermatitis around both eyelids and had similar rash on the arm pits)  >> used Aquaphor and finally went away in Spring.  >> came back in November/December    Physical exam:  General: In no acute distress, well-developed, well-nourished  Eyes: no conjunctivitis  ENT: no signs of rhinitis   Pulmonary: no wheezing or coughing  Skin:in the moment no skin lesions, but in December 2020 around eyelids clear subacute to chronic eyelid dermatitis    Earlier History and Allergy exams:  Jaziel had 2 episodes of some sort of allergic reaction this summer:  The worst was the most recent. At 1230 PM on Monday July 29 Jaziel had an allergic reaction. The only thing he had ingested that day was his pre-workout- caffeine, beta alanine, etc. He was beginning his work out using a foam roller when he noticed a reaction begin to occur. He noticed dry and scratchy throat through  the first 5 min. His eyes then got dry took out contacts. His noose started to become extremely runny. There was some wheezing and trouble breathing on his walk home (10 min). He even had to stop at a restaurant because his nose was running so bad. His eyes started swelling he thinks while he was walking home- he has a picture 30 min after onset of symptoms. Right eye was extremely swollen and red and left was swollen and red but not as bad.    Once he got home had roommate call into work for him and took the photo. He took 1 benadryl and cold shower and waited for symptoms to calm. It took about 30 min for symptoms to beginning to improve. At 8 pm the swelling had significantly went down but was still swollen shut without really trying to open. In the morning he was very much improved, but there was still some swelling. Almost by noon the symptoms had cleared. His only thoughts on what may have happened are that he possibly touched a pine tree on the way in.     First episode occurred around May around 8 pm on a weekday and had a normal day with no symptoms. He describes the episode as dry eyes, throat, and runny nose. At the time he was eating a peanut butter and jelly sandwich- but he is able to eat peanut butter frequently. He did not have a headache that day and did not take any medication besides his prescriptions. The only contribution he could think of at the time was increased dust from a new fan they had put up in their house.episode occurred a couple months ago but there was no swelling.     Admits to seasonal allergies. Sometimes will immediately break out in hives about once ever couple days- carries hydrocortisone cream for this; this happens at work at [employer]. 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     He has had allergy testing in the past and reports he was positive for: dust, ragweed, mold, grass, cats, trees as a kid- went to allergist as a kid because his hands would get  so dry that they would crack and bleed. This was seasonal- summer, spring, fall.    Jaziel denies smoking.   Jaziel does use cocaine and ki occasionally but not around times of the episodes.    Past Medical History:   Patient Active Problem List   Diagnosis     Diarrhea, unspecified type     Adjustment disorder with mixed anxiety and depressed mood     Unprotected sexual intercourse     Cocaine abuse (H)     Moderate major depression (H)     Alcohol abuse, episodic drinking behavior     BRITTANY (generalized anxiety disorder)     No past medical history on file.  Past Surgical History:   Procedure Laterality Date     HC TOOTH EXTRACTION W/FORCEP  12/28/2010    [place] teeth       Social History:  Patient reports that he has never smoked. He has never used smokeless tobacco. He reports current alcohol use. He reports current drug use. Drugs: Cocaine and MDMA (Ecstasy).    Family History:  Family History   Problem Relation Age of Onset     Substance Abuse Mother      Anxiety Disorder Mother      Depression Mother      Mental Illness Mother      Depression Father      Anxiety Disorder Father        Medications:  Current Outpatient Medications   Medication Sig Dispense Refill     buPROPion (WELLBUTRIN XL) 300 MG 24 hr tablet Take 1 tablet (300 mg) by mouth every morning 90 tablet 0     caffeine (NO-DOZE) 200 MG TABS tablet Take 200 mg by mouth       cetirizine (ZYRTEC) 10 MG tablet Take 1 tablet (10 mg) by mouth daily 90 tablet 3     cholecalciferol (VITAMIN D3) 125 mcg (5000 units) capsule Take 1 capsule (125 mcg) by mouth daily 90 capsule 3     desonide (DESOWEN) 0.05 % external cream Apply topically 2 times daily To eyelids until rash is clear 15 g 0     emollient (VANICREAM) external cream Apply topically as needed for other (dry skin or eczema) 453 g 1     FLUoxetine (PROZAC) 20 MG capsule Take 1 capsule (20 mg) by mouth daily 100 capsule 0     hydrOXYzine (ATARAX) 25 MG tablet Take 1 tablet (25 mg) by mouth every 8 hours  as needed for itching 90 tablet 3     Omega-3 Fatty Acids (FISH OIL) 1200 MG capsule Take 1,200 mg by mouth daily       predniSONE (DELTASONE) 50 MG tablet Emergency set: if again allergic reaction take immediately 100mg Prednisone and 2 Tabl Antihistamine (Cetirizine) and then write 12h retrospective diary 2 tablet 3       Allergies   Allergen Reactions     Cats      Dust Mites      Trees          Atopy Screen (Placed 09/20/19 )    No Substance Readings (15 min) Evaluation   POS Histamine 1mg/ml ++    NEG NaCl 0.9% -      No Substance Readings (15 min) Evaluation   1 Alternaria alternata (tenuis)  -    2 Cladosporium herbarum -    3 Aspergillus fumigatus -    4 Penicillium notatum -    5 Dermatophagoides pteronyssinus ++++    6 Dermatophagoides farinae ++++    7 Dog epithelium (canis spp) ++    8 Cat hair (yuli catus) +++    9 Cockroach   (Blatella americana & germanica) -    10 Grass mix midwest   (Dayanara, Orchard, Redtop, Luca) +    11 Esrg grass (sorghum halepense) -    12 Weed mix   (common Cocklebur, Lamb s quarters, rough redroot Pigweed, Dock/Sorrel) +/++    13 Mug wort (artemisia vulgare) ++    14 Ragweed giant/short (ambrosia spp) +++    15 English Plantain (plantago lanceolata) -    16 Tree mix 1 (Pecan, Maple BHR, Oak RVW, american [place], black [place]) ++++    17 Red cedar (juniperus virginia) +++    18 Tree mix 2   (white Gurdeep, river/red Birch, black [place], common [place], american Elm) ++++    19 Box elder/Maple mix (acer spp) ++++    20 Trigg shagbark (carya ovata) ++++               Milk, Meat, Egg, and Grains (09/20/19 )    No Substance Readings (15min) Evaluation   11 Barley (hordeum vulgare) -    12 Buckwheat (fagopyrum esculentum) -    13 Corn (rosa mais) -    14 Hops (humulus lupulus)  -    15 Malt (hordeum vulgare)  -    16 Oat (kallie sativa) -    17 Rice (oryza sativa) -    18 Rye (secale cereale) -    19 Whole wheat (triticum aestivum) -      Conclusion: massive sensitizations to  house dust mites and tree pollens and ragweed. Less to grass pollens and other weeds and dog/cat epithelia. No signs for allergy to wheat and similars.     Assessment & Plan:    ==> Final Diagnosis:     # Acute Angioedema- in face without breathing problems --> maybe linked to severe sensitization to house dust mites and tree/ragweed pollens.  * chronic illness with exacerbation, progression, side effects from treatment  No medications involved  Atopic predisposition with pollen and house dust mite allergy  No common food exposure.  If repeated angioedema the safety of propranolol with allergic reactions should be evaluated.  Possible aggravation by elicit drug use?    # atopic predisposition with    Rhinoconjunctivitis more morning and seasonal aggravation with sensitization to HDM and pollens.     Recurrent seasonal eye lid dermatitis in winter DDx atopic dermatitis vs delayed type reaction to molds or dust mites vs allergic contact dermatitis   * chronic illness with exacerbation, progression, side effects from treatment    These conclusions are made at the best of one's knowledge and belief based on the provided evidence such as patient's history and allergy test results and they can change over time or can be incomplete because of missing information's.    ==> Treatment Plan:    >> Emergency set: if again allergic reaction take immediately 100mg Prednisone and 2 Tabl Antihistamine (Cetirizine) and then write 12h retrospective diary. This is to carry with him at all times    >> if recurrences, then try Elidel cream 2xdaily until gone (or alternatively Protopic or generic Tacrolimus) and Aquaphor and non-irritant soap/shampoo (e.g. Free&Clear products)  >> if also outside of November/December, then we should plan patch tests (Standard, preservatives, emulsifiers, fragrances and patch tests with HDM, cat, and molds)      CC Areli Ulrich, APRN CNP  [address] [address] on close of this  encounter.      Staff: : provider      Follow-up in Derm-Allergy clinic if necessary  ___________________________      I spent a total of 30 minutes with Bib Cates during today s  visit. This time was spent discussing all the individual test results, correlating them to the clinical relevance, counseling the patient and/or coordinating care           Again, thank you for allowing me to participate in the care of your patient.        Sincerely,        Lopez Teixeira MD

## 2022-02-22 NOTE — NURSING NOTE
Dermatology Rooming Note    Bib Cates's goals for this visit include:   Chief Complaint   Patient presents with     Allergy Recheck     Jaziel states the prednisone and topical steriods helped but it came back. He did a second round and finally went away.     Gil Ferraro, [facility]

## 2022-02-22 NOTE — PROGRESS NOTES
[facility] Dermato-allergology Note  Office visit  Encounter Date: Feb 22, 2022  ____________________________________________    CC: No chief complaint on file.      HPI:  (02/21/22)  Mr. Bib Cates is a(n) 31 year old male who presents today as a return patient for allergy consultation  - last seen 09/20/2019  - another angioedema in September 2021. Patinent was asleep, around 2pm and felt something crawling on the hands, smacked it and washed the hands. Lay down in couch with open window and in the morning red eyes with periorbital swellling (but not lips and not breathing problems) --> maybe exposure to HDM or cat allergen on couch or ragweed pollen from outside  >> winter 2020 November/December eyelids very dry and itchy and flaky (in photos from December 2020 lichenoid dermatitis around both eyelids and had similar rash on the arm pits)  >> used Aquaphor and finally went away in Spring.  >> came back in November/December    Physical exam:  General: In no acute distress, well-developed, well-nourished  Eyes: no conjunctivitis  ENT: no signs of rhinitis   Pulmonary: no wheezing or coughing  Skin:in the moment no skin lesions, but in December 2020 around eyelids clear subacute to chronic eyelid dermatitis    Earlier History and Allergy exams:  Jaziel had 2 episodes of some sort of allergic reaction this summer:  The worst was the most recent. At 1230 PM on Monday July 29 Jaziel had an allergic reaction. The only thing he had ingested that day was his pre-workout- caffeine, beta alanine, etc. He was beginning his work out using a foam roller when he noticed a reaction begin to occur. He noticed dry and scratchy throat through the first 5 min. His eyes then got dry took out contacts. His noose started to become extremely runny. There was some wheezing and trouble breathing on his walk home (10 min). He even had to stop at a restaurant because his nose was running so bad. His eyes started  swelling he thinks while he was walking home- he has a picture 30 min after onset of symptoms. Right eye was extremely swollen and red and left was swollen and red but not as bad.    Once he got home had roommate call into work for him and took the photo. He took 1 benadryl and cold shower and waited for symptoms to calm. It took about 30 min for symptoms to beginning to improve. At 8 pm the swelling had significantly went down but was still swollen shut without really trying to open. In the morning he was very much improved, but there was still some swelling. Almost by noon the symptoms had cleared. His only thoughts on what may have happened are that he possibly touched a pine tree on the way in.     First episode occurred around May around 8 pm on a weekday and had a normal day with no symptoms. He describes the episode as dry eyes, throat, and runny nose. At the time he was eating a peanut butter and jelly sandwich- but he is able to eat peanut butter frequently. He did not have a headache that day and did not take any medication besides his prescriptions. The only contribution he could think of at the time was increased dust from a new fan they had put up in their house.episode occurred a couple months ago but there was no swelling.     Admits to seasonal allergies. Sometimes will immediately break out in hives about once ever couple days- carries hydrocortisone cream for this; this happens at work at [employer]. 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     He has had allergy testing in the past and reports he was positive for: dust, ragweed, mold, grass, cats, trees as a kid- went to allergist as a kid because his hands would get so dry that they would crack and bleed. This was seasonal- summer, spring, fall.    Jaziel denies smoking.   Jaziel does use cocaine and ki occasionally but not around times of the episodes.    Past Medical History:   Patient Active Problem List   Diagnosis      Diarrhea, unspecified type     Adjustment disorder with mixed anxiety and depressed mood     Unprotected sexual intercourse     Cocaine abuse (H)     Moderate major depression (H)     Alcohol abuse, episodic drinking behavior     BRITTANY (generalized anxiety disorder)     No past medical history on file.  Past Surgical History:   Procedure Laterality Date     HC TOOTH EXTRACTION W/FORCEP  12/28/2010    [place] teeth       Social History:  Patient reports that he has never smoked. He has never used smokeless tobacco. He reports current alcohol use. He reports current drug use. Drugs: Cocaine and MDMA (Ecstasy).    Family History:  Family History   Problem Relation Age of Onset     Substance Abuse Mother      Anxiety Disorder Mother      Depression Mother      Mental Illness Mother      Depression Father      Anxiety Disorder Father        Medications:  Current Outpatient Medications   Medication Sig Dispense Refill     buPROPion (WELLBUTRIN XL) 300 MG 24 hr tablet Take 1 tablet (300 mg) by mouth every morning 90 tablet 0     caffeine (NO-DOZE) 200 MG TABS tablet Take 200 mg by mouth       cetirizine (ZYRTEC) 10 MG tablet Take 1 tablet (10 mg) by mouth daily 90 tablet 3     cholecalciferol (VITAMIN D3) 125 mcg (5000 units) capsule Take 1 capsule (125 mcg) by mouth daily 90 capsule 3     desonide (DESOWEN) 0.05 % external cream Apply topically 2 times daily To eyelids until rash is clear 15 g 0     emollient (VANICREAM) external cream Apply topically as needed for other (dry skin or eczema) 453 g 1     FLUoxetine (PROZAC) 20 MG capsule Take 1 capsule (20 mg) by mouth daily 100 capsule 0     hydrOXYzine (ATARAX) 25 MG tablet Take 1 tablet (25 mg) by mouth every 8 hours as needed for itching 90 tablet 3     Omega-3 Fatty Acids (FISH OIL) 1200 MG capsule Take 1,200 mg by mouth daily       predniSONE (DELTASONE) 50 MG tablet Emergency set: if again allergic reaction take immediately 100mg Prednisone and 2 Tabl Antihistamine  (Cetirizine) and then write 12h retrospective diary 2 tablet 3       Allergies   Allergen Reactions     Cats      Dust Mites      Trees          Atopy Screen (Placed 09/20/19 )    No Substance Readings (15 min) Evaluation   POS Histamine 1mg/ml ++    NEG NaCl 0.9% -      No Substance Readings (15 min) Evaluation   1 Alternaria alternata (tenuis)  -    2 Cladosporium herbarum -    3 Aspergillus fumigatus -    4 Penicillium notatum -    5 Dermatophagoides pteronyssinus ++++    6 Dermatophagoides farinae ++++    7 Dog epithelium (canis spp) ++    8 Cat hair (yuli catus) +++    9 Cockroach   (Blatella americana & germanica) -    10 Grass mix midwest   (Dayanara, Orchard, Redtop, Luca) +    11 Serg grass (sorghum halepense) -    12 Weed mix   (common Cocklebur, Lamb s quarters, rough redroot Pigweed, Dock/Sorrel) +/++    13 Mug wort (artemisia vulgare) ++    14 Ragweed giant/short (ambrosia spp) +++    15 English Plantain (plantago lanceolata) -    16 Tree mix 1 (Pecan, Maple BHR, Oak RVW, american [place], black [place]) ++++    17 Red cedar (juniperus virginia) +++    18 Tree mix 2   (white Gurdeep, river/red Birch, black [place], common [place], american Elm) ++++    19 Box elder/Maple mix (acer spp) ++++    20 [place] shagbark (carya ovata) ++++               Milk, Meat, Egg, and Grains (09/20/19 )    No Substance Readings (15min) Evaluation   11 Barley (hordeum vulgare) -    12 Buckwheat (fagopyrum esculentum) -    13 Corn (rosa mais) -    14 Hops (humulus lupulus)  -    15 Malt (hordeum vulgare)  -    16 Oat (kallie sativa) -    17 Rice (oryza sativa) -    18 Rye (secale cereale) -    19 Whole wheat (triticum aestivum) -      Conclusion: massive sensitizations to house dust mites and tree pollens and ragweed. Less to grass pollens and other weeds and dog/cat epithelia. No signs for allergy to wheat and similars.     Assessment & Plan:    ==> Final Diagnosis:     # Acute Angioedema- in face without breathing problems  --> maybe linked to severe sensitization to house dust mites and tree/ragweed pollens.  * chronic illness with exacerbation, progression, side effects from treatment  No medications involved  Atopic predisposition with pollen and house dust mite allergy  No common food exposure.  If repeated angioedema the safety of propranolol with allergic reactions should be evaluated.  Possible aggravation by elicit drug use?    # atopic predisposition with    Rhinoconjunctivitis more morning and seasonal aggravation with sensitization to HDM and pollens.     Recurrent seasonal eye lid dermatitis in winter DDx atopic dermatitis vs delayed type reaction to molds or dust mites vs allergic contact dermatitis   * chronic illness with exacerbation, progression, side effects from treatment    These conclusions are made at the best of one's knowledge and belief based on the provided evidence such as patient's history and allergy test results and they can change over time or can be incomplete because of missing information's.    ==> Treatment Plan:    >> Emergency set: if again allergic reaction take immediately 100mg Prednisone and 2 Tabl Antihistamine (Cetirizine) and then write 12h retrospective diary. This is to carry with him at all times    >> if recurrences, then try Elidel cream 2xdaily until gone (or alternatively Protopic or generic Tacrolimus) and Aquaphor and non-irritant soap/shampoo (e.g. Free&Clear products)  >> if also outside of November/December, then we should plan patch tests (Standard, preservatives, emulsifiers, fragrances and patch tests with HDM, cat, and molds)      CC Areli Ulrich, CASI CNP  [address] [address] on close of this encounter.      Staff: : provider      Follow-up in Derm-Allergy clinic if necessary  ___________________________      I spent a total of 30 minutes with Bib Cates during today s  visit. This time was spent discussing all the individual test results,  correlating them to the clinical relevance, counseling the patient and/or coordinating care

## 2022-03-06 ASSESSMENT — PATIENT HEALTH QUESTIONNAIRE - PHQ9
SUM OF ALL RESPONSES TO PHQ QUESTIONS 1-9: 11
10. IF YOU CHECKED OFF ANY PROBLEMS, HOW DIFFICULT HAVE THESE PROBLEMS MADE IT FOR YOU TO DO YOUR WORK, TAKE CARE OF THINGS AT HOME, OR GET ALONG WITH OTHER PEOPLE: SOMEWHAT DIFFICULT
SUM OF ALL RESPONSES TO PHQ QUESTIONS 1-9: 11

## 2022-03-07 ASSESSMENT — PATIENT HEALTH QUESTIONNAIRE - PHQ9: SUM OF ALL RESPONSES TO PHQ QUESTIONS 1-9: 11

## 2022-03-08 ASSESSMENT — PATIENT HEALTH QUESTIONNAIRE - PHQ9
10. IF YOU CHECKED OFF ANY PROBLEMS, HOW DIFFICULT HAVE THESE PROBLEMS MADE IT FOR YOU TO DO YOUR WORK, TAKE CARE OF THINGS AT HOME, OR GET ALONG WITH OTHER PEOPLE: SOMEWHAT DIFFICULT
SUM OF ALL RESPONSES TO PHQ QUESTIONS 1-9: 11

## 2022-03-08 NOTE — PROGRESS NOTES
"  Assessment & Plan     Adjustment disorder with mixed anxiety and depressed mood  He feels like his depression has improved. He feels he is stable on his current medication dose and requests a refill for this today.  - buPROPion (WELLBUTRIN XL) 300 MG 24 hr tablet  Dispense: 90 tablet; Refill: 0    Poor sleep  Trouble staying asleep: this is every night, he gets about 7 hours of interrupted sleep. He has been snoring, his partner tells him he has also been mumbling and shouting out at times, also that he is gasping and intermittently stops breathing. He thinks he had a sleep study back in 2013- at that time he was told he maybe had narcolepsy or sleep apnea but no treatment was ever started.    - Adult Sleep Eval & Management  Referral    Screen for STD (sexually transmitted disease)  Recent new sexual partner and would like screening. No known exposure, no signs/symptoms.  - NEISSERIA GONORRHOEA PCR  - CHLAMYDIA TRACHOMATIS PCR  - HIV Antigen Antibody Combo  - Treponema Abs w Reflex to RPR and Titer    Lipid screening  Routine screening  - Lipid panel reflex to direct LDL Fasting    Screening for diabetes mellitus  Routine screening  - Glucose       BMI:   Estimated body mass index is 29.27 kg/m  as calculated from the following:    Height as of this encounter: 1.727 m (5' 8\").    Weight as of this encounter: 87.3 kg (192 lb 8 oz).   Weight management plan: Discussed healthy diet and exercise guidelines    See Patient Instructions    Return in about 9 months (around 12/9/2022) for annual exam, follow up earlier if any concerns.    Maty Yun DNP FNP student    I was present with the NP Student during the history and exam.  I discussed the case with the NP Student and agree with the findings as documented in the assessment and plan.     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      Any significant life events: relationship concerns and health concerns  Patient is not feeling anxious or having panic attacks.  Patient has no concerns about alcohol or drug use.       Today's PHQ-9         PHQ-9 Total Score: 11  PHQ-9 Q9 Thoughts of better off dead/self-harm past 2 weeks :   (P) Not at all    How difficult have these problems made it for you to do your work, take care of things at home, or get along with other people: Somewhat difficult        He eats 2-3 servings of fruits and vegetables daily.He consumes 0 sweetened beverage(s) daily.He exercises with enough effort to increase his heart rate 60 or more minutes per day.  He exercises with enough effort to increase his heart rate 6 days per week.   He is taking medications regularly.     The patient is here today for STI screening.  Last STI screeninyears  History of STI:  no  Sexually active yes- new partner, female  Known exposures:  no  Symptoms of concern: no    Depression and Anxiety Follow-Up    How are you doing with your depression since your last visit? Improved : he feels like it is better    How are you doing with your anxiety since your last visit?  Improved \"not so much\"    Are you having other symptoms that might be associated with depression or anxiety? Yes:  trouble staying asleep: this is every night, he gets about 7 hours of interrupted sleep: he has been snoring, his partner tells him he has also been mumbling and shouting out at times, also that he is gasping and stopping breathing at times    Have you had a significant life event? OTHER: mom was in rehab, celinaister has a new eating disorder     Do you have any concerns with your use of alcohol or other drugs? No    Social History     Tobacco Use     " "Smoking status: Never Smoker     Smokeless tobacco: Never Used   Substance Use Topics     Alcohol use: Yes     Comment: 5/week     Drug use: Yes     Types: Cocaine, MDMA (Ecstasy)     PHQ 2/22/2021 7/12/2021 3/6/2022   PHQ-9 Total Score 10 7 11   Q9: Thoughts of better off dead/self-harm past 2 weeks Several days Several days Not at all     BRITTANY-7 SCORE 7/17/2019 2/22/2021 7/12/2021   Total Score 4 6 5     Last PHQ-9 3/6/2022   1.  Little interest or pleasure in doing things 1   2.  Feeling down, depressed, or hopeless 0   3.  Trouble falling or staying asleep, or sleeping too much 3   4.  Feeling tired or having little energy 2   5.  Poor appetite or overeating 1   6.  Feeling bad about yourself 1   7.  Trouble concentrating 2   8.  Moving slowly or restless 1   Q9: Thoughts of better off dead/self-harm past 2 weeks 0   PHQ-9 Total Score 11   Difficulty at work, home, or with people -       Suicide Assessment Five-step Evaluation and Treatment (SAFE-T)        Review of Systems   Constitutional, HEENT, cardiovascular, pulmonary, gi and gu systems are negative, except as otherwise noted.      Objective    /68   Pulse 64   Temp 97.3  F (36.3  C)   Ht 1.727 m (5' 8\")   Wt 87.3 kg (192 lb 8 oz)   SpO2 98%   BMI 29.27 kg/m    Body mass index is 29.27 kg/m .  Physical Exam   GENERAL: healthy, alert and no distress  EYES: Eyes grossly normal to inspection, PERRL and conjunctivae and sclerae normal  HENT: ear canals and TM's normal, nose and mouth without ulcers or lesions  NECK: no adenopathy, no asymmetry, masses, or scars and thyroid normal to palpation  RESP: lungs clear to auscultation - no rales, rhonchi or wheezes  CV: regular rate and rhythm, normal S1 S2, no S3 or S4, no murmur, click or rub, no peripheral edema and peripheral pulses strong  ABDOMEN: soft, nontender, no hepatosplenomegaly, no masses and bowel sounds normal  MS: no gross musculoskeletal defects noted, no edema  SKIN: no suspicious lesions " or rashes  NEURO: Normal strength and tone, mentation intact and speech normal  PSYCH: mentation appears normal, affect normal/bright

## 2022-03-09 ENCOUNTER — OFFICE VISIT (OUTPATIENT)
Dept: FAMILY MEDICINE | Facility: CLINIC | Age: 32
End: 2022-03-09
Payer: COMMERCIAL

## 2022-03-09 ENCOUNTER — LAB (OUTPATIENT)
Dept: LAB | Facility: CLINIC | Age: 32
End: 2022-03-09

## 2022-03-09 VITALS
OXYGEN SATURATION: 98 % | WEIGHT: 192.5 LBS | HEART RATE: 64 BPM | DIASTOLIC BLOOD PRESSURE: 68 MMHG | TEMPERATURE: 97.3 F | HEIGHT: 68 IN | BODY MASS INDEX: 29.18 KG/M2 | SYSTOLIC BLOOD PRESSURE: 116 MMHG

## 2022-03-09 DIAGNOSIS — Z13.1 SCREENING FOR DIABETES MELLITUS: ICD-10-CM

## 2022-03-09 DIAGNOSIS — Z11.3 SCREEN FOR STD (SEXUALLY TRANSMITTED DISEASE): ICD-10-CM

## 2022-03-09 DIAGNOSIS — Z13.220 LIPID SCREENING: ICD-10-CM

## 2022-03-09 DIAGNOSIS — R06.83 SNORING: ICD-10-CM

## 2022-03-09 DIAGNOSIS — Z72.820 POOR SLEEP: ICD-10-CM

## 2022-03-09 DIAGNOSIS — F43.23 ADJUSTMENT DISORDER WITH MIXED ANXIETY AND DEPRESSED MOOD: Primary | ICD-10-CM

## 2022-03-09 LAB
CHOLEST SERPL-MCNC: 233 MG/DL
FASTING STATUS PATIENT QL REPORTED: YES
FASTING STATUS PATIENT QL REPORTED: YES
GLUCOSE BLD-MCNC: 90 MG/DL (ref 70–99)
HDLC SERPL-MCNC: 62 MG/DL
HIV 1+2 AB+HIV1 P24 AG SERPL QL IA: NONREACTIVE
LDLC SERPL CALC-MCNC: 155 MG/DL
NONHDLC SERPL-MCNC: 171 MG/DL
T PALLIDUM AB SER QL: NONREACTIVE
TRIGL SERPL-MCNC: 80 MG/DL

## 2022-03-09 PROCEDURE — 90686 IIV4 VACC NO PRSV 0.5 ML IM: CPT | Performed by: NURSE PRACTITIONER

## 2022-03-09 PROCEDURE — 36415 COLL VENOUS BLD VENIPUNCTURE: CPT

## 2022-03-09 PROCEDURE — 87491 CHLMYD TRACH DNA AMP PROBE: CPT

## 2022-03-09 PROCEDURE — 80061 LIPID PANEL: CPT

## 2022-03-09 PROCEDURE — 87389 HIV-1 AG W/HIV-1&-2 AB AG IA: CPT

## 2022-03-09 PROCEDURE — 86780 TREPONEMA PALLIDUM: CPT

## 2022-03-09 PROCEDURE — 99214 OFFICE O/P EST MOD 30 MIN: CPT | Mod: 25 | Performed by: NURSE PRACTITIONER

## 2022-03-09 PROCEDURE — 82947 ASSAY GLUCOSE BLOOD QUANT: CPT

## 2022-03-09 PROCEDURE — 90471 IMMUNIZATION ADMIN: CPT | Performed by: NURSE PRACTITIONER

## 2022-03-09 PROCEDURE — 90715 TDAP VACCINE 7 YRS/> IM: CPT | Performed by: NURSE PRACTITIONER

## 2022-03-09 PROCEDURE — 87591 N.GONORRHOEAE DNA AMP PROB: CPT

## 2022-03-09 PROCEDURE — 90472 IMMUNIZATION ADMIN EACH ADD: CPT | Performed by: NURSE PRACTITIONER

## 2022-03-09 RX ORDER — BUPROPION HYDROCHLORIDE 300 MG/1
300 TABLET ORAL EVERY MORNING
Qty: 90 TABLET | Refills: 0 | Status: SHIPPED | OUTPATIENT
Start: 2022-03-09 | End: 2022-05-27

## 2022-03-09 ASSESSMENT — ANXIETY QUESTIONNAIRES
6. BECOMING EASILY ANNOYED OR IRRITABLE: NOT AT ALL
1. FEELING NERVOUS, ANXIOUS, OR ON EDGE: NOT AT ALL
2. NOT BEING ABLE TO STOP OR CONTROL WORRYING: SEVERAL DAYS
IF YOU CHECKED OFF ANY PROBLEMS ON THIS QUESTIONNAIRE, HOW DIFFICULT HAVE THESE PROBLEMS MADE IT FOR YOU TO DO YOUR WORK, TAKE CARE OF THINGS AT HOME, OR GET ALONG WITH OTHER PEOPLE: NOT DIFFICULT AT ALL
GAD7 TOTAL SCORE: 3
5. BEING SO RESTLESS THAT IT IS HARD TO SIT STILL: SEVERAL DAYS
7. FEELING AFRAID AS IF SOMETHING AWFUL MIGHT HAPPEN: NOT AT ALL
3. WORRYING TOO MUCH ABOUT DIFFERENT THINGS: NOT AT ALL

## 2022-03-09 ASSESSMENT — PATIENT HEALTH QUESTIONNAIRE - PHQ9: 5. POOR APPETITE OR OVEREATING: SEVERAL DAYS

## 2022-03-09 NOTE — PATIENT INSTRUCTIONS
Patient Education     What Are Snoring and Obstructive Sleep Apnea?  If you ve ever had a stuffed-up nose, you know the feeling of trying to breathe through a very narrow passageway. This is what happens in your throat when you snore. While you sleep, structures in your throat partly block your air passage. This makes the passage narrow and hard to breathe through. In some cases, the entire passage may become blocked so you can t breathe at all. This is called obstructive sleep apnea.      Snoring       Obstructive sleep apnea      Snoring  If your throat structures are too large or the muscles relax too much during sleep, the air passage may be partly blocked for a brief moment. But the air eventually passes through. As air from the nose or mouth passes around this blockage, the throat structures vibrate. This causes the familiar sound of snoring. This snoring sound can be loud and may wake up others, or even themselves, during the night. Snoring gets worse as more and more of the air passage is blocked.   Obstructive sleep apnea  If the structures partly or completely block the throat, air can t flow to the lungs at all. This is called hypopnea (decreased breathing) or apnea (meaning  no breathing ). The lungs aren t getting fresh air. So the brain tells the body to wake up just enough to tighten the muscles and unblock the air passage. With a loud gasp, breathing starts again. This process may be repeated over and over again during the night. This can make your sleep fragmented with lighter stages of sleep. You may not remember waking up many times during the night however due to lighter sleep you will most likely feel tired the next day. The lack of sleep and fresh air can also strain your lungs, heart, and other organs. This may lead to problems such as high blood pressure, diabetes, behavioral disorders, heart attack, or stroke.   Problems in the nose and jaw  Problems in the structure of the nose may block  breathing. A crooked (deviated) septum or swollen turbinates can make snoring worse or lead to apnea. Also, a receding jaw may make the tongue sit too far back. Then it s more likely to block the airway when you re asleep.   [employer] last reviewed this educational content on 9/1/2019 2000-2021 [employer]. All rights reserved. This information is not intended as a substitute for professional medical care. Always follow your healthcare professional's instructions.           Patient Education     Understanding Anxiety Disorders  Almost everyone gets nervous now and then. It s normal to have knots in your stomach before a test. Or for your heart to beat fast on a first date. But an anxiety disorder is much more than a case of nerves. In fact, its symptoms may be overwhelming. But treatment can ease many of these symptoms. Talking with your healthcare provider is the first step.    What are anxiety disorders?  An anxiety disorder causes very strong feelings of panic and fear. These feelings may occur for no clear reason. And they tend to happen again and again. They may prevent you from coping with life. They may cause you great distress. You may then stay away from anything that triggers your fear. In extreme cases, you may never leave the house. Anxiety disorders may cause other symptoms, such as:    Obsessive thoughts that are unwanted and you can t control    Constant nightmares or painful thoughts of the past    Upset stomach, sweating, and muscle tension    Trouble sleeping or focusing  What causes anxiety disorders?  Anxiety disorders tend to run in families. For some people, childhood abuse or neglect may play a role. For others, stressful life events or trauma may trigger these disorders. Anxiety can trigger low self-esteem and poor coping skills.  Types of anxiety disorders    Panic disorder. This causes a very strong fear of being in danger.    Phobias. These are extreme fears of certain  things, places, or events.    Obsessive-compulsive disorder (OCD). This makes you have unwanted thoughts and urges. You also may do certain things over and over.    Posttraumatic stress disorder (PTSD). This occurs in people who have been through a terrible ordeal. It can cause nightmares and flashbacks about the event.    Generalized anxiety disorder. This causes constant worry. It can greatly disrupt your life.    Getting better  You may believe that nothing can help you. Or, you might fear what others may think. But most anxiety symptoms can be eased. Having an anxiety disorder is nothing to be ashamed of. Most people do best with treatment that combines medicine and individual and group therapy. These aren t cures. But they can help you live a healthier life.  [employer] last reviewed this educational content on 3/1/2020    0519-5223 [employer]. All rights reserved. This information is not intended as a substitute for professional medical care. Always follow your healthcare professional's instructions.           Patient Education     Depression: Tips to Help Yourself    As your healthcare providers help treat your depression, you can also help yourself. Keep in mind that your illness affects you emotionally, physically, mentally, and socially. So full recovery will take time. Take care of your body and your soul, and be patient with yourself as you get better.  Self-care    Educate yourself. Read about treatment and medicine options. If you have the energy, attend local conferences or support groups. Keep a list of useful websites and helpful books and use them as needed. This illness is not your fault. Don t blame yourself for your depression.    Manage early symptoms. If you notice symptoms returning, experience triggers, or identify other factors that may lead to a depressive episode, get help as soon as possible. Ask trusted friends and family to monitor your behavior and let you know if they  see anything of concern.    Work with your provider. Find a provider you can trust. Communicate honestly with that person and share information on your treatment for depression and your reaction to medicines.    Be prepared for a crisis. Know what to do if you experience a crisis. Keep the phone number of a crisis hotline and know the location of your community's urgent care centers and the closest emergency department.    Hold off on big decisions. Depression can cloud your judgment. So wait until you feel better before making major life decisions, such as changing jobs, moving, or getting  or .    Be patient. Recovering from depression is a process. Don t be discouraged if it takes some time to feel better.    Keep it simple. Depression saps your energy and concentration. So you won t be able to do all the things you used to do. Set small goals and do what you can.    Be with others. Don t isolate yourself--you ll only feel worse. Try to be with other people. And take part in fun activities when you can. Go to a movie, ballgame, Confucianism service, or social event. Talk openly with people you can trust. And accept help when it s offered.    Take care of your body  People with depression often lose the desire to take care of themselves. That only makes their problems worse. During treatment and afterward, make a point to:    Exercise. It s a great way to take care of your body. And studies have shown that exercise helps fight depression. Aim for 30 minutes of moderate activity a day. Walking in small blocks of time (5-10 minutes) is a good way to start, but anything that gets you moving (gardening, house cleaning) counts.    Don't use drugs and alcohol. These may ease the pain in the short term. But they ll only make your problems worse in the long run.    Get relief from stress. Ask your healthcare provider for relaxation exercises and techniques to help relieve stress. Consider activities like  meditation, yoga, or Shahab Chi.    Eat right. A balanced and healthy diet helps keep your body healthy.    Get adequate sleep. Aim for 8 hours per night. Too much or too little sleep can cause other physical and emotional problems.  [employer] last reviewed this educational content on 12/1/2019 2000-2021 [employer]. All rights reserved. This information is not intended as a substitute for professional medical care. Always follow your healthcare professional's instructions.

## 2022-03-10 LAB
C TRACH DNA SPEC QL NAA+PROBE: NEGATIVE
N GONORRHOEA DNA SPEC QL NAA+PROBE: NEGATIVE

## 2022-03-10 ASSESSMENT — ANXIETY QUESTIONNAIRES: GAD7 TOTAL SCORE: 3

## 2022-03-26 ENCOUNTER — HEALTH MAINTENANCE LETTER (OUTPATIENT)
Age: 32
End: 2022-03-26

## 2022-04-08 ENCOUNTER — MYC MEDICAL ADVICE (OUTPATIENT)
Dept: FAMILY MEDICINE | Facility: CLINIC | Age: 32
End: 2022-04-08
Payer: COMMERCIAL

## 2022-04-08 DIAGNOSIS — F33.1 MODERATE EPISODE OF RECURRENT MAJOR DEPRESSIVE DISORDER (H): ICD-10-CM

## 2022-04-08 DIAGNOSIS — F41.1 GAD (GENERALIZED ANXIETY DISORDER): Primary | ICD-10-CM

## 2022-04-10 NOTE — TELEPHONE ENCOUNTER
Please ask which pharmacy and help pt set up an appointment with me to follow up.     Can do during his health maintenance exam he is due for if that is preferred.     Thanks,   Areli LANCE CNP

## 2022-04-11 RX ORDER — FLUOXETINE 40 MG/1
40 CAPSULE ORAL DAILY
Qty: 30 CAPSULE | Refills: 1 | Status: SHIPPED | OUTPATIENT
Start: 2022-04-11 | End: 2022-09-13

## 2022-05-24 ENCOUNTER — TELEPHONE (OUTPATIENT)
Dept: SLEEP MEDICINE | Facility: CLINIC | Age: 32
End: 2022-05-24
Payer: COMMERCIAL

## 2022-05-24 NOTE — TELEPHONE ENCOUNTER
Attempted to reach patient via phone unsuccessful left message we are needing previous sleep study records. I send two request for records and had not heard back. Patient instructed to call [facility] for sleep study records phone number given and send via fax to my direct fax. Fax number also given.     Patient to call back with any questions.           ALISSON aFll  [facility]

## 2022-05-27 ENCOUNTER — MYC REFILL (OUTPATIENT)
Dept: FAMILY MEDICINE | Facility: CLINIC | Age: 32
End: 2022-05-27

## 2022-05-27 DIAGNOSIS — F43.23 ADJUSTMENT DISORDER WITH MIXED ANXIETY AND DEPRESSED MOOD: ICD-10-CM

## 2022-05-27 DIAGNOSIS — F32.1 MODERATE MAJOR DEPRESSION (H): ICD-10-CM

## 2022-05-27 DIAGNOSIS — F41.1 GAD (GENERALIZED ANXIETY DISORDER): ICD-10-CM

## 2022-05-30 ASSESSMENT — SLEEP AND FATIGUE QUESTIONNAIRES

## 2022-05-31 RX ORDER — BUPROPION HYDROCHLORIDE 300 MG/1
300 TABLET ORAL EVERY MORNING
Qty: 90 TABLET | Refills: 0 | Status: SHIPPED | OUTPATIENT
Start: 2022-05-31 | End: 2022-08-29

## 2022-05-31 NOTE — TELEPHONE ENCOUNTER
Refilled meds, routing to [facility] to please request he schedule a follow up visit via video or phone please for sleep and mental health within 3 months (before I do any additional refills)    Thanks!  Areli

## 2022-06-01 ENCOUNTER — VIRTUAL VISIT (OUTPATIENT)
Dept: SLEEP MEDICINE | Facility: CLINIC | Age: 32
End: 2022-06-01
Attending: NURSE PRACTITIONER
Payer: COMMERCIAL

## 2022-06-01 VITALS — WEIGHT: 192 LBS | BODY MASS INDEX: 29.1 KG/M2 | HEIGHT: 68 IN

## 2022-06-01 DIAGNOSIS — Z72.820 POOR SLEEP: ICD-10-CM

## 2022-06-01 DIAGNOSIS — R06.83 SNORING: ICD-10-CM

## 2022-06-01 DIAGNOSIS — G47.33 OSA (OBSTRUCTIVE SLEEP APNEA): Primary | ICD-10-CM

## 2022-06-01 DIAGNOSIS — G47.52 RBD (REM BEHAVIORAL DISORDER): ICD-10-CM

## 2022-06-01 PROCEDURE — 99205 OFFICE O/P NEW HI 60 MIN: CPT | Mod: 95 | Performed by: INTERNAL MEDICINE

## 2022-06-01 NOTE — NURSING NOTE
Phone call made to patient to notified him that sleep study order has been routed to day sleep technicians and they will be reaching out patient on scheduling.   Patient informed urine drug screen can be done at primary care clinic and order is in the chart.     Patient had no further questions.     Areli Medrano [facility]
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
## 2022-06-01 NOTE — PROGRESS NOTES
Jaziel is a 31 year old who is being evaluated via a billable video visit.      How would you like to obtain your AVS? MyChart  If the video visit is dropped, the invitation should be resent by: Text to cell phone: [phone]  Will anyone else be joining your video visit? No      Video Start Time: 11 AM  Video-Visit Details    Type of service:  Video Visit    Video End Time:11:40 AM    Originating Location (pt. Location): Home    Distant Location (provider location):  [facility]     Platform used for Video Visit: Belle Sánchez

## 2022-07-05 DIAGNOSIS — F32.1 MODERATE MAJOR DEPRESSION (H): ICD-10-CM

## 2022-07-05 DIAGNOSIS — F41.1 GAD (GENERALIZED ANXIETY DISORDER): ICD-10-CM

## 2022-07-06 NOTE — TELEPHONE ENCOUNTER
"Requested Prescriptions   Pending Prescriptions Disp Refills     FLUoxetine (PROZAC) 20 MG capsule 100 capsule 0     Sig: Take 1 capsule (20 mg) by mouth daily       SSRIs Protocol Failed - 7/5/2022  7:52 AM        Failed - PHQ-9 score less than 5 in past 6 months     Please review last PHQ-9 score.           Passed - Medication is active on med list        Passed - Patient is age 18 or older        Passed - Recent (6 mo) or future (30 days) visit within the authorizing provider's specialty     Patient had office visit in the last 6 months or has a visit in the next 30 days with authorizing provider or within the authorizing provider's specialty.  See \"Patient Info\" tab in inbasket, or \"Choose Columns\" in Meds & Orders section of the refill encounter.               Has appt 9-13.    Karla HENDRICKS RN    "

## 2022-08-29 ENCOUNTER — MYC REFILL (OUTPATIENT)
Dept: FAMILY MEDICINE | Facility: CLINIC | Age: 32
End: 2022-08-29

## 2022-08-29 DIAGNOSIS — F43.23 ADJUSTMENT DISORDER WITH MIXED ANXIETY AND DEPRESSED MOOD: ICD-10-CM

## 2022-08-29 DIAGNOSIS — F32.1 MODERATE MAJOR DEPRESSION (H): ICD-10-CM

## 2022-08-29 DIAGNOSIS — F41.1 GAD (GENERALIZED ANXIETY DISORDER): ICD-10-CM

## 2022-08-30 RX ORDER — BUPROPION HYDROCHLORIDE 300 MG/1
300 TABLET ORAL EVERY MORNING
Qty: 30 TABLET | Refills: 0 | Status: SHIPPED | OUTPATIENT
Start: 2022-08-30 | End: 2022-09-13

## 2022-08-30 NOTE — TELEPHONE ENCOUNTER
"Requested Prescriptions   Pending Prescriptions Disp Refills     FLUoxetine (PROZAC) 20 MG capsule 100 capsule 0     Sig: Take 1 capsule (20 mg) by mouth daily       SSRIs Protocol Failed - 8/29/2022 12:32 PM        Failed - PHQ-9 score less than 5 in past 6 months     Please review last PHQ-9 score.           Passed - Medication is Bupropion     If the medication is Bupropion (Wellbutrin), and the patient is taking for smoking cessation; OK to refill.          Passed - Medication is active on med list        Passed - Patient is age 18 or older        Passed - Recent (6 mo) or future (30 days) visit within the authorizing provider's specialty     Patient had office visit in the last 6 months or has a visit in the next 30 days with authorizing provider or within the authorizing provider's specialty.  See \"Patient Info\" tab in inbasket, or \"Choose Columns\" in Meds & Orders section of the refill encounter.               buPROPion (WELLBUTRIN XL) 300 MG 24 hr tablet 90 tablet 0     Sig: Take 1 tablet (300 mg) by mouth every morning       SSRIs Protocol Failed - 8/29/2022 12:32 PM        Failed - PHQ-9 score less than 5 in past 6 months     Please review last PHQ-9 score.           Passed - Medication is Bupropion     If the medication is Bupropion (Wellbutrin), and the patient is taking for smoking cessation; OK to refill.          Passed - Medication is active on med list        Passed - Patient is age 18 or older        Passed - Recent (6 mo) or future (30 days) visit within the authorizing provider's specialty     Patient had office visit in the last 6 months or has a visit in the next 30 days with authorizing provider or within the authorizing provider's specialty.  See \"Patient Info\" tab in inbasket, or \"Choose Columns\" in Meds & Orders section of the refill encounter.               Has appt 9-13-22    Karla HENDRICKS RN    "

## 2022-09-12 ASSESSMENT — ENCOUNTER SYMPTOMS
PARESTHESIAS: 0
WEAKNESS: 0
COUGH: 0
FEVER: 0
ABDOMINAL PAIN: 0
ARTHRALGIAS: 0
FREQUENCY: 0
DIZZINESS: 0
EYE PAIN: 0
NERVOUS/ANXIOUS: 0
PALPITATIONS: 0
HEADACHES: 0
CONSTIPATION: 0
DIARRHEA: 0
HEMATOCHEZIA: 0
HEMATURIA: 0
JOINT SWELLING: 0
SHORTNESS OF BREATH: 0
DYSURIA: 0
MYALGIAS: 0
CHILLS: 0
NAUSEA: 0
SORE THROAT: 0
HEARTBURN: 0

## 2022-09-13 ENCOUNTER — OFFICE VISIT (OUTPATIENT)
Dept: FAMILY MEDICINE | Facility: CLINIC | Age: 32
End: 2022-09-13
Payer: COMMERCIAL

## 2022-09-13 VITALS
SYSTOLIC BLOOD PRESSURE: 122 MMHG | HEART RATE: 64 BPM | DIASTOLIC BLOOD PRESSURE: 86 MMHG | BODY MASS INDEX: 28.96 KG/M2 | TEMPERATURE: 97.1 F | WEIGHT: 195.5 LBS | RESPIRATION RATE: 14 BRPM | HEIGHT: 69 IN | OXYGEN SATURATION: 99 %

## 2022-09-13 DIAGNOSIS — L30.9 ECZEMA, UNSPECIFIED TYPE: ICD-10-CM

## 2022-09-13 DIAGNOSIS — Z91.09 ENVIRONMENTAL ALLERGIES: ICD-10-CM

## 2022-09-13 DIAGNOSIS — F43.23 ADJUSTMENT DISORDER WITH MIXED ANXIETY AND DEPRESSED MOOD: ICD-10-CM

## 2022-09-13 DIAGNOSIS — Z00.00 ROUTINE GENERAL MEDICAL EXAMINATION AT A HEALTH CARE FACILITY: Primary | ICD-10-CM

## 2022-09-13 DIAGNOSIS — D22.9 NUMEROUS SKIN MOLES: ICD-10-CM

## 2022-09-13 DIAGNOSIS — F41.1 GAD (GENERALIZED ANXIETY DISORDER): ICD-10-CM

## 2022-09-13 DIAGNOSIS — F32.1 MODERATE MAJOR DEPRESSION (H): ICD-10-CM

## 2022-09-13 PROCEDURE — 90686 IIV4 VACC NO PRSV 0.5 ML IM: CPT | Performed by: NURSE PRACTITIONER

## 2022-09-13 PROCEDURE — 99395 PREV VISIT EST AGE 18-39: CPT | Mod: 25 | Performed by: NURSE PRACTITIONER

## 2022-09-13 PROCEDURE — 90471 IMMUNIZATION ADMIN: CPT | Performed by: NURSE PRACTITIONER

## 2022-09-13 RX ORDER — LEVOCETIRIZINE DIHYDROCHLORIDE 5 MG/1
5 TABLET, FILM COATED ORAL EVERY EVENING
Qty: 90 TABLET | Refills: 3 | Status: SHIPPED | OUTPATIENT
Start: 2022-09-13

## 2022-09-13 RX ORDER — BUPROPION HYDROCHLORIDE 300 MG/1
300 TABLET ORAL EVERY MORNING
Qty: 90 TABLET | Refills: 3 | Status: SHIPPED | OUTPATIENT
Start: 2022-09-13 | End: 2023-03-13

## 2022-09-13 ASSESSMENT — ENCOUNTER SYMPTOMS
COUGH: 0
SORE THROAT: 0
ABDOMINAL PAIN: 0
HEMATURIA: 0
HEARTBURN: 0
CHILLS: 0
HEMATOCHEZIA: 0
WEAKNESS: 0
SHORTNESS OF BREATH: 0
ARTHRALGIAS: 0
CONSTIPATION: 0
MYALGIAS: 0
JOINT SWELLING: 0
NAUSEA: 0
PARESTHESIAS: 0
EYE PAIN: 0
DIARRHEA: 0
FREQUENCY: 0
HEADACHES: 0
DIZZINESS: 0
DYSURIA: 0
NERVOUS/ANXIOUS: 0
FEVER: 0
PALPITATIONS: 0

## 2022-09-13 ASSESSMENT — PAIN SCALES - GENERAL: PAINLEVEL: NO PAIN (0)

## 2022-09-13 ASSESSMENT — PATIENT HEALTH QUESTIONNAIRE - PHQ9
10. IF YOU CHECKED OFF ANY PROBLEMS, HOW DIFFICULT HAVE THESE PROBLEMS MADE IT FOR YOU TO DO YOUR WORK, TAKE CARE OF THINGS AT HOME, OR GET ALONG WITH OTHER PEOPLE: SOMEWHAT DIFFICULT
SUM OF ALL RESPONSES TO PHQ QUESTIONS 1-9: 9
SUM OF ALL RESPONSES TO PHQ QUESTIONS 1-9: 9

## 2022-09-13 NOTE — PROGRESS NOTES
SUBJECTIVE:   CC: Bib Cates is an 31 year old male who presents for preventative health visit.         Healthy Habits:     Getting at least 3 servings of Calcium per day:  Yes    Bi-annual eye exam:  Yes    Dental care twice a year:  NO    Sleep apnea or symptoms of sleep apnea:  Excessive snoring and Sleep apnea    Diet:  Regular (no restrictions)    Frequency of exercise:  4-5 days/week    Duration of exercise:  Greater than 60 minutes    Taking medications regularly:  Yes    Medication side effects:  None    PHQ-2 Total Score: 1    Additional concerns today:  No          Today's PHQ-2 Score:   PHQ-2 ( 1999 Pf[employer]) 9/12/2022   Q1: Little interest or pleasure in doing things 0   Q2: Feeling down, depressed or hopeless 0   PHQ-2 Score 0   PHQ-2 Total Score (12-17 Years)- Positive if 3 or more points; Administer PHQ-A if positive -   Q1: Little interest or pleasure in doing things Not at all   Q2: Feeling down, depressed or hopeless Not at all   PHQ-2 Score 0       Abuse: Current or Past(Physical, Sexual or Emotional)- No  Do you feel safe in your environment? Yes    Have you ever done Advance Care Planning? (For example, a Health Directive, POLST, or a discussion with a medical provider or your loved ones about your wishes): No, advance care planning information given to patient to review.  Patient plans to discuss their wishes with loved ones or provider.      Social History     Tobacco Use     Smoking status: Never Smoker     Smokeless tobacco: Never Used   Substance Use Topics     Alcohol use: Yes     Comment: 5/week     If you drink alcohol do you typically have >3 drinks per day or >7 drinks per week? No    Alcohol Use 9/12/2022   Prescreen: >3 drinks/day or >7 drinks/week? No       Last PSA: No results found for: PSA    Reviewed orders with patient. Reviewed health maintenance and updated orders accordingly - Yes  Labs reviewed in EPIC  BP Readings from Last 3 Encounters:   09/13/22 122/86   03/09/22  116/68   01/20/21 102/70    Wt Readings from Last 3 Encounters:   09/13/22 88.7 kg (195 lb 8 oz)   06/01/22 87.1 kg (192 lb)   03/09/22 87.3 kg (192 lb 8 oz)                  Patient Active Problem List   Diagnosis     Diarrhea, unspecified type     Adjustment disorder with mixed anxiety and depressed mood     Unprotected sexual intercourse     Cocaine abuse (H)     Moderate major depression (H)     Alcohol abuse, episodic drinking behavior     BRITTANY (generalized anxiety disorder)     Past Surgical History:   Procedure Laterality Date     HC TOOTH EXTRACTION W/FORCEP  12/28/2010    [place] teeth       Social History     Tobacco Use     Smoking status: Never Smoker     Smokeless tobacco: Never Used   Substance Use Topics     Alcohol use: Yes     Comment: 5/week     Family History   Problem Relation Age of Onset     Substance Abuse Mother      Anxiety Disorder Mother      Depression Mother      Mental Illness Mother      Depression Father      Anxiety Disorder Father      Depression Sister      Anxiety Disorder Sister      Depression Brother      Anxiety Disorder Brother          Current Outpatient Medications   Medication Sig Dispense Refill     buPROPion (WELLBUTRIN XL) 300 MG 24 hr tablet Take 1 tablet (300 mg) by mouth every morning 90 tablet 3     cholecalciferol (VITAMIN D3) 125 mcg (5000 units) capsule Take 1 capsule (125 mcg) by mouth daily 90 capsule 3     FLUoxetine (PROZAC) 20 MG capsule Take 1 capsule (20 mg) by mouth daily 90 capsule 3     Omega-3 Fatty Acids (FISH OIL) 1200 MG capsule Take 1,200 mg by mouth daily       pimecrolimus (ELIDEL) 1 % external cream Apply topically 2 times daily Use it when eye lid dermatitis is back (Patient not taking: Reported on 9/13/2022) 30 g 1     Allergies   Allergen Reactions     Cats      Dust Mites      Grass      Mugwort [Artemisia Vulgaris]      Ragweeds      Trees        Reviewed and updated as needed this visit by clinical staff                    Reviewed and  "updated as needed this visit by Provider                   History reviewed. No pertinent past medical history.   Past Surgical History:   Procedure Laterality Date     HC TOOTH EXTRACTION W/FORCEP  12/28/2010    [place] teeth       Review of Systems   Constitutional: Negative for chills and fever.   HENT: Negative for congestion, ear pain, hearing loss and sore throat.    Eyes: Negative for pain and visual disturbance.   Respiratory: Negative for cough and shortness of breath.    Cardiovascular: Negative for chest pain, palpitations and peripheral edema.   Gastrointestinal: Negative for abdominal pain, constipation, diarrhea, heartburn, hematochezia and nausea.   Genitourinary: Negative for dysuria, frequency, genital sores, hematuria, impotence, penile discharge and urgency.   Musculoskeletal: Negative for arthralgias, joint swelling and myalgias.   Skin: Positive for rash.   Neurological: Negative for dizziness, weakness, headaches and paresthesias.   Psychiatric/Behavioral: Negative for mood changes. The patient is not nervous/anxious.      Eczema around eyes better after using a friend's cream, will find out what it was so he can get his own if possible  Congestion and runny nose currently  wellbutrin and prozac great at current dosages, has been very stable and minimal use of alcohol now, knows to notify if any concerns    OBJECTIVE:   /86   Pulse 64   Temp 97.1  F (36.2  C)   Resp 14   Ht 1.762 m (5' 9.37\")   Wt 88.7 kg (195 lb 8 oz)   SpO2 99%   BMI 28.56 kg/m      Physical Exam  GENERAL: healthy, alert and no distress  EYES: Eyes grossly normal to inspection, PERRL and conjunctivae and sclerae normal  HENT: ear canals and TM's normal, nose and mouth without ulcers or lesions  NECK: no adenopathy, no asymmetry, masses, or scars and thyroid normal to palpation  RESP: lungs clear to auscultation - no rales, rhonchi or wheezes  CV: regular rate and rhythm, normal S1 S2, no S3 or S4, no murmur, click " "or rub, no peripheral edema and peripheral pulses strong  ABDOMEN: soft, nontender, no hepatosplenomegaly, no masses and bowel sounds normal  MS: no gross musculoskeletal defects noted, no edema  SKIN: no suspicious lesions or rashes  NEURO: Normal strength and tone, mentation intact and speech normal  PSYCH: mentation appears normal, affect normal/bright    Diagnostic Test Results:  Labs reviewed in Epic    ASSESSMENT/PLAN:       ICD-10-CM    1. Routine general medical examination at a health care facility  Z00.00    2. Moderate major depression (H)  F32.1 FLUoxetine (PROZAC) 20 MG capsule   3. BRITTANY (generalized anxiety disorder)  F41.1 FLUoxetine (PROZAC) 20 MG capsule   4. Adjustment disorder with mixed anxiety and depressed mood  F43.23 buPROPion (WELLBUTRIN XL) 300 MG 24 hr tablet   5. Numerous skin moles  D22.9 Adult Dermatology Referral   6. Environmental allergies  Z91.09 levocetirizine (XYZAL) 5 MG tablet   doing great, keep up good changes on lifestyle, recognize changes in mood or anxiety and notify if has any  Send name of cream for skin and I'll order it          COUNSELING:   Reviewed preventive health counseling, as reflected in patient instructions    Estimated body mass index is 29.19 kg/m  as calculated from the following:    Height as of 6/1/22: 1.727 m (5' 8\").    Weight as of 6/1/22: 87.1 kg (192 lb).     Weight management plan: Discussed healthy diet and exercise guidelines    He reports that he has never smoked. He has never used smokeless tobacco.      Counseling Resources:  ATP IV Guidelines  Pooled Cohorts Equation Calculator  FRAX Risk Assessment  ICSI Preventive Guidelines  Dietary Guidelines for Americans, 2010  USDA's MyPlate  ASA Prophylaxis  Lung CA Screening    Areli Ulrich, CASI CRAFT  [facility]  Answers for HPI/ROS submitted by the patient on 9/13/2022  If you checked off any problems, how difficult have these problems made it for you to do your work, " take care of things at home, or get along with other people?: Somewhat difficult  PHQ9 TOTAL SCORE: 9

## 2022-09-13 NOTE — PATIENT INSTRUCTIONS
keep working on your good changes on lifestyle, I refilled your meds to last a year or plan to follow up in 6 months for a check in once during the winter if you wish.   Please if you recognize changes in mood or anxiety or any concerns, then notify us    Send name of cream for skin and I'll order it for you. Take care!    If you do wish to find me:  [facility]  Located in: [facility] ([facility] near [place])    Address: [facility] [facility] [place] [address]    Phone: [phone]  Appointments: [email or web address]     Preventive Health Recommendations  Male Ages 26 - 39    Yearly exam:             See your health care provider every year in order to  o   Review health changes.   o   Discuss preventive care.    o   Review your medicines if your doctor has prescribed any.  You should be tested each year for STDs (sexually transmitted diseases), if you re at risk.   After age 35, talk to your provider about cholesterol testing. If you are at risk for heart disease, have your cholesterol tested at least every 5 years.   If you are at risk for diabetes, you should have a diabetes test (fasting glucose).  Shots: Get a flu shot each year. Get a tetanus shot every 10 years.     Nutrition:  Eat at least 5 servings of fruits and vegetables daily.   Eat whole-grain bread, whole-wheat pasta and brown rice instead of white grains and rice.   Get adequate Calcium and Vitamin D.     Lifestyle  Exercise for at least 150 minutes a week (30 minutes a day, 5 days a week). This will help you control your weight and prevent disease.   Limit alcohol to one drink per day.   No smoking.   Wear sunscreen to prevent skin cancer.   See your dentist every six months for an exam and cleaning.

## 2022-09-16 RX ORDER — TRIAMCINOLONE ACETONIDE 1 MG/G
CREAM TOPICAL 2 TIMES DAILY
Qty: 80 G | Refills: 3 | Status: SHIPPED | OUTPATIENT
Start: 2022-09-16 | End: 2022-09-30

## 2023-03-13 ENCOUNTER — E-VISIT (OUTPATIENT)
Dept: FAMILY MEDICINE | Facility: CLINIC | Age: 33
End: 2023-03-13
Payer: COMMERCIAL

## 2023-03-13 DIAGNOSIS — F41.1 GAD (GENERALIZED ANXIETY DISORDER): ICD-10-CM

## 2023-03-13 DIAGNOSIS — F32.1 MODERATE MAJOR DEPRESSION (H): ICD-10-CM

## 2023-03-13 DIAGNOSIS — F43.23 ADJUSTMENT DISORDER WITH MIXED ANXIETY AND DEPRESSED MOOD: ICD-10-CM

## 2023-03-13 PROCEDURE — 99421 OL DIG E/M SVC 5-10 MIN: CPT | Performed by: FAMILY MEDICINE

## 2023-03-13 RX ORDER — BUPROPION HYDROCHLORIDE 300 MG/1
300 TABLET ORAL EVERY MORNING
Qty: 90 TABLET | Refills: 0 | Status: SHIPPED | OUTPATIENT
Start: 2023-03-13 | End: 2023-03-14

## 2023-03-13 NOTE — TELEPHONE ENCOUNTER
Pharmacy calling stating pt is requesting refill for Wellbutrin XL and fluoxetine, pt last seen by Areli Ulrich on 9/22. Mychart sent to patient to request they make an appointment to establish care with new provider or do medication f/u visit.    Please advise,  thanks!  Nate Boland RN   [facility]

## 2023-03-14 RX ORDER — BUPROPION HYDROCHLORIDE 300 MG/1
300 TABLET ORAL EVERY MORNING
Qty: 90 TABLET | Refills: 0 | Status: SHIPPED | OUTPATIENT
Start: 2023-03-14 | End: 2023-07-24

## 2023-03-14 ASSESSMENT — ANXIETY QUESTIONNAIRES
6. BECOMING EASILY ANNOYED OR IRRITABLE: SEVERAL DAYS
2. NOT BEING ABLE TO STOP OR CONTROL WORRYING: SEVERAL DAYS
4. TROUBLE RELAXING: SEVERAL DAYS
8. IF YOU CHECKED OFF ANY PROBLEMS, HOW DIFFICULT HAVE THESE MADE IT FOR YOU TO DO YOUR WORK, TAKE CARE OF THINGS AT HOME, OR GET ALONG WITH OTHER PEOPLE?: SOMEWHAT DIFFICULT
1. FEELING NERVOUS, ANXIOUS, OR ON EDGE: SEVERAL DAYS
5. BEING SO RESTLESS THAT IT IS HARD TO SIT STILL: SEVERAL DAYS
7. FEELING AFRAID AS IF SOMETHING AWFUL MIGHT HAPPEN: NOT AT ALL
3. WORRYING TOO MUCH ABOUT DIFFERENT THINGS: SEVERAL DAYS
GAD7 TOTAL SCORE: 6
7. FEELING AFRAID AS IF SOMETHING AWFUL MIGHT HAPPEN: NOT AT ALL
GAD7 TOTAL SCORE: 6
GAD7 TOTAL SCORE: 6

## 2023-03-15 ASSESSMENT — ANXIETY QUESTIONNAIRES: GAD7 TOTAL SCORE: 6

## 2023-03-15 ASSESSMENT — PATIENT HEALTH QUESTIONNAIRE - PHQ9: SUM OF ALL RESPONSES TO PHQ QUESTIONS 1-9: 9

## 2023-05-08 ASSESSMENT — ANXIETY QUESTIONNAIRES
7. FEELING AFRAID AS IF SOMETHING AWFUL MIGHT HAPPEN: MORE THAN HALF THE DAYS
8. IF YOU CHECKED OFF ANY PROBLEMS, HOW DIFFICULT HAVE THESE MADE IT FOR YOU TO DO YOUR WORK, TAKE CARE OF THINGS AT HOME, OR GET ALONG WITH OTHER PEOPLE?: SOMEWHAT DIFFICULT
IF YOU CHECKED OFF ANY PROBLEMS ON THIS QUESTIONNAIRE, HOW DIFFICULT HAVE THESE PROBLEMS MADE IT FOR YOU TO DO YOUR WORK, TAKE CARE OF THINGS AT HOME, OR GET ALONG WITH OTHER PEOPLE: SOMEWHAT DIFFICULT
5. BEING SO RESTLESS THAT IT IS HARD TO SIT STILL: SEVERAL DAYS
GAD7 TOTAL SCORE: 8
4. TROUBLE RELAXING: MORE THAN HALF THE DAYS
3. WORRYING TOO MUCH ABOUT DIFFERENT THINGS: SEVERAL DAYS
7. FEELING AFRAID AS IF SOMETHING AWFUL MIGHT HAPPEN: MORE THAN HALF THE DAYS
6. BECOMING EASILY ANNOYED OR IRRITABLE: NOT AT ALL
GAD7 TOTAL SCORE: 8
GAD7 TOTAL SCORE: 8
1. FEELING NERVOUS, ANXIOUS, OR ON EDGE: SEVERAL DAYS
2. NOT BEING ABLE TO STOP OR CONTROL WORRYING: SEVERAL DAYS

## 2023-05-08 ASSESSMENT — PATIENT HEALTH QUESTIONNAIRE - PHQ9
SUM OF ALL RESPONSES TO PHQ QUESTIONS 1-9: 15
SUM OF ALL RESPONSES TO PHQ QUESTIONS 1-9: 15
10. IF YOU CHECKED OFF ANY PROBLEMS, HOW DIFFICULT HAVE THESE PROBLEMS MADE IT FOR YOU TO DO YOUR WORK, TAKE CARE OF THINGS AT HOME, OR GET ALONG WITH OTHER PEOPLE: SOMEWHAT DIFFICULT

## 2023-05-09 ENCOUNTER — VIRTUAL VISIT (OUTPATIENT)
Dept: FAMILY MEDICINE | Facility: CLINIC | Age: 33
End: 2023-05-09
Payer: COMMERCIAL

## 2023-05-09 DIAGNOSIS — R25.1 TREMOR: ICD-10-CM

## 2023-05-09 DIAGNOSIS — L30.9 ECZEMA, UNSPECIFIED TYPE: ICD-10-CM

## 2023-05-09 DIAGNOSIS — F41.1 GAD (GENERALIZED ANXIETY DISORDER): Primary | ICD-10-CM

## 2023-05-09 DIAGNOSIS — F33.1 MODERATE EPISODE OF RECURRENT MAJOR DEPRESSIVE DISORDER (H): ICD-10-CM

## 2023-05-09 DIAGNOSIS — F19.11 HX OF SUBSTANCE ABUSE (H): ICD-10-CM

## 2023-05-09 PROBLEM — Z72.51 UNPROTECTED SEXUAL INTERCOURSE: Status: RESOLVED | Noted: 2018-01-17 | Resolved: 2023-05-09

## 2023-05-09 PROBLEM — R19.7 DIARRHEA, UNSPECIFIED TYPE: Status: RESOLVED | Noted: 2018-01-17 | Resolved: 2023-05-09

## 2023-05-09 PROCEDURE — 99214 OFFICE O/P EST MOD 30 MIN: CPT | Mod: VID | Performed by: FAMILY MEDICINE

## 2023-05-09 RX ORDER — ESCITALOPRAM OXALATE 10 MG/1
10 TABLET ORAL DAILY
Qty: 90 TABLET | Refills: 1 | Status: SHIPPED | OUTPATIENT
Start: 2023-05-09 | End: 2024-05-16

## 2023-05-09 RX ORDER — HYDROXYZINE PAMOATE 25 MG/1
25 CAPSULE ORAL 3 TIMES DAILY PRN
Qty: 60 CAPSULE | Refills: 1 | Status: SHIPPED | OUTPATIENT
Start: 2023-05-09 | End: 2023-10-29

## 2023-05-09 RX ORDER — DESONIDE 0.5 MG/G
CREAM TOPICAL 2 TIMES DAILY
Qty: 15 G | Refills: 1 | Status: SHIPPED | OUTPATIENT
Start: 2023-05-09 | End: 2024-01-29

## 2023-05-09 ASSESSMENT — PATIENT HEALTH QUESTIONNAIRE - PHQ9
10. IF YOU CHECKED OFF ANY PROBLEMS, HOW DIFFICULT HAVE THESE PROBLEMS MADE IT FOR YOU TO DO YOUR WORK, TAKE CARE OF THINGS AT HOME, OR GET ALONG WITH OTHER PEOPLE: SOMEWHAT DIFFICULT
SUM OF ALL RESPONSES TO PHQ QUESTIONS 1-9: 15

## 2023-05-09 ASSESSMENT — ANXIETY QUESTIONNAIRES: GAD7 TOTAL SCORE: 8

## 2023-05-09 NOTE — PROGRESS NOTES
Jaziel is a 32 year old who is being evaluated via a billable video visit.      How would you like to obtain your AVS? MyChart  If the video visit is dropped, the invitation should be resent by: Text to cell phone: [phone]  Will anyone else be joining your video visit? No          Assessment & Plan     BRITTANY (generalized anxiety disorder)  Since patient does not currently feel that her symptoms of depression and anxiety are adequately controlled on the regimen of fluoxetine and bupropion I offered some options to consider either an increase in the bupropion, increasing fluoxetine, or transition the fluoxetine to a different medication because of the concern that it may be contributing to the tremor.  Patient opted for the last option.  Therefore they can go ahead and discontinue the fluoxetine at this time.  After about 10 to 14 days off the fluoxetine I have suggested they start the Lexapro at 1/2 tablet daily and then continue that for 7 to 10 days before increasing to a full tablet.  A recheck was recommended in 4 to 6 weeks by E-visit or virtual visit to see how the transition has gone.  - hydrOXYzine (VISTARIL) 25 MG capsule; Take 1 capsule (25 mg) by mouth 3 times daily as needed for anxiety  - escitalopram (LEXAPRO) 10 MG tablet; Take 1 tablet (10 mg) by mouth daily    Moderate episode of recurrent major depressive disorder (H)  As above.  - escitalopram (LEXAPRO) 10 MG tablet; Take 1 tablet (10 mg) by mouth daily    Hx of substance abuse (H)  Patient confirms some current infrequent substance use for symptoms of depression and anxiety and we reviewed that today.    Eczema, unspecified type  Patient has a history of what sounds like eczema involving the periocular skin and eyelids.  I suggested some desonide cream to see if that would be helpful.  - desonide (DESOWEN) 0.05 % external cream; Apply topically 2 times daily    Tremor  Mild tremor was noted on exam today but unclear exactly what type.  Could be  "medication related and due to the fluoxetine so per the patient choice above we will be discontinuing that medication at this time.               BMI:   Estimated body mass index is 28.56 kg/m  as calculated from the following:    Height as of 9/13/22: 1.762 m (5' 9.37\").    Weight as of 9/13/22: 88.7 kg (195 lb 8 oz).           Ian Crnae MD  [facility] MEGA Sheriff is a 32 year old, presenting for the following health issues:  Recheck Medication         View : No data to display.              History of Present Illness       Mental Health Follow-up:  Patient presents to follow-up on Depression & Anxiety.Patient's depression since last visit has been:  Medium  The patient is not having other symptoms associated with depression.  Patient's anxiety since last visit has been:  Bad  The patient is having other symptoms associated with anxiety.  Any significant life events: relationship concerns, job concerns, financial concerns, grief or loss and health concerns  Patient is feeling anxious or having panic attacks.  Patient has concerns about alcohol or drug use.    He eats 2-3 servings of fruits and vegetables daily.He consumes 0 sweetened beverage(s) daily.He exercises with enough effort to increase his heart rate 60 or more minutes per day.  He exercises with enough effort to increase his heart rate 5 days per week.   He is taking medications regularly.    Today's PHQ-9         PHQ-9 Total Score: 15    PHQ-9 Q9 Thoughts of better off dead/self-harm past 2 weeks :   Not at all    How difficult have these problems made it for you to do your work, take care of things at home, or get along with other people: Somewhat difficult  Today's BRITTANY-7 Score: 8       Patient was seen today by video visit for establishment of care and follow-up on chronic mood issues.  Not feeling that the symptoms of depression and anxiety are really under very good control at this time and expressed a " concern that they developed a slight tremor after going on the fluoxetine in the past.  Sounds like the only new medications that the patient has tried include fluoxetine and bupropion.  Past prescriptions for BuSpar and trazodone were not helpful.  Propranolol is been used in the past and the patient also notes significant sexual side effects from use of gabapentin in the past.  They have used hydroxyzine on an as-needed basis and would like a refill on that.  The patient is not currently seeing a therapist.  They were interested in getting off the fluoxetine because of the perception it was contributing to the tremor.        Review of Systems   Constitutional, HEENT, cardiovascular, pulmonary, gi and gu systems are negative, except as otherwise noted.      Objective           Vitals:  No vitals were obtained today due to virtual visit.    Physical Exam   GENERAL: Healthy, alert and no distress  EYES: Eyes grossly normal to inspection.  No discharge or erythema, or obvious scleral/conjunctival abnormalities.  RESP: No audible wheeze, cough, or visible cyanosis.  No visible retractions or increased work of breathing.    SKIN: Visible skin clear. No significant rash, abnormal pigmentation or lesions.  NEURO: Cranial nerves grossly intact.  Mentation and speech appropriate for age.  PSYCH: Mentation appears normal, affect normal/bright, judgement and insight intact, normal speech and appearance well-groomed.                Video-Visit Details    Type of service:  Video Visit     Originating Location (pt. Location): Home    Distant Location (provider location):  On-site  Platform used for Video Visit: [email or web address]

## 2023-06-30 ENCOUNTER — MYC MEDICAL ADVICE (OUTPATIENT)
Dept: FAMILY MEDICINE | Facility: CLINIC | Age: 33
End: 2023-06-30
Payer: COMMERCIAL

## 2023-06-30 DIAGNOSIS — L21.9 SEBORRHEIC DERMATITIS: Primary | ICD-10-CM

## 2023-06-30 RX ORDER — KETOCONAZOLE 20 MG/G
CREAM TOPICAL DAILY
Qty: 30 G | Refills: 1 | Status: SHIPPED | OUTPATIENT
Start: 2023-06-30 | End: 2024-01-29

## 2023-06-30 RX ORDER — PREDNISONE 20 MG/1
20 TABLET ORAL 2 TIMES DAILY
Qty: 6 TABLET | Refills: 0 | Status: SHIPPED | OUTPATIENT
Start: 2023-06-30 | End: 2023-07-03

## 2023-07-24 ENCOUNTER — MYC REFILL (OUTPATIENT)
Dept: FAMILY MEDICINE | Facility: CLINIC | Age: 33
End: 2023-07-24
Payer: COMMERCIAL

## 2023-07-24 DIAGNOSIS — F43.23 ADJUSTMENT DISORDER WITH MIXED ANXIETY AND DEPRESSED MOOD: ICD-10-CM

## 2023-07-25 RX ORDER — BUPROPION HYDROCHLORIDE 300 MG/1
300 TABLET ORAL EVERY MORNING
Qty: 90 TABLET | Refills: 1 | Status: SHIPPED | OUTPATIENT
Start: 2023-07-25 | End: 2023-10-29

## 2023-07-25 NOTE — TELEPHONE ENCOUNTER
"Requested Prescriptions   Pending Prescriptions Disp Refills    buPROPion (WELLBUTRIN XL) 300 MG 24 hr tablet 90 tablet 0     Sig: Take 1 tablet (300 mg) by mouth every morning       SSRIs Protocol Failed - 7/24/2023  4:45 PM        Failed - PHQ-9 score less than 5 in past 6 months     Please review last PHQ-9 score.           Passed - Medication is Bupropion     If the medication is Bupropion (Wellbutrin), and the patient is taking for smoking cessation; OK to refill.          Passed - Medication is active on med list        Passed - Patient is age 18 or older        Passed - Recent (6 mo) or future (30 days) visit within the authorizing provider's specialty     Patient had office visit in the last 6 months or has a visit in the next 30 days with authorizing provider or within the authorizing provider's specialty.  See \"Patient Info\" tab in inbasket, or \"Choose Columns\" in Meds & Orders section of the refill encounter.               Sent [employer] to update PHQ-9     Kaley HENDRICKS RN  [facility]    "

## 2023-08-14 ENCOUNTER — PATIENT OUTREACH (OUTPATIENT)
Dept: CARE COORDINATION | Facility: CLINIC | Age: 33
End: 2023-08-14
Payer: COMMERCIAL

## 2023-08-28 ENCOUNTER — PATIENT OUTREACH (OUTPATIENT)
Dept: CARE COORDINATION | Facility: CLINIC | Age: 33
End: 2023-08-28
Payer: COMMERCIAL

## 2023-10-29 ENCOUNTER — MYC REFILL (OUTPATIENT)
Dept: FAMILY MEDICINE | Facility: CLINIC | Age: 33
End: 2023-10-29
Payer: COMMERCIAL

## 2023-10-29 DIAGNOSIS — F41.1 GAD (GENERALIZED ANXIETY DISORDER): ICD-10-CM

## 2023-10-29 DIAGNOSIS — F43.23 ADJUSTMENT DISORDER WITH MIXED ANXIETY AND DEPRESSED MOOD: ICD-10-CM

## 2023-10-29 RX ORDER — BUPROPION HYDROCHLORIDE 300 MG/1
300 TABLET ORAL EVERY MORNING
Qty: 90 TABLET | Refills: 1 | Status: SHIPPED | OUTPATIENT
Start: 2023-10-29 | End: 2024-04-25

## 2023-10-29 RX ORDER — HYDROXYZINE PAMOATE 25 MG/1
25 CAPSULE ORAL 3 TIMES DAILY PRN
Qty: 60 CAPSULE | Refills: 1 | Status: SHIPPED | OUTPATIENT
Start: 2023-10-29 | End: 2024-01-29

## 2023-12-17 ENCOUNTER — HEALTH MAINTENANCE LETTER (OUTPATIENT)
Age: 33
End: 2023-12-17

## 2024-01-29 ENCOUNTER — MYC REFILL (OUTPATIENT)
Dept: FAMILY MEDICINE | Facility: CLINIC | Age: 34
End: 2024-01-29
Payer: COMMERCIAL

## 2024-01-29 DIAGNOSIS — F43.23 ADJUSTMENT DISORDER WITH MIXED ANXIETY AND DEPRESSED MOOD: ICD-10-CM

## 2024-01-29 DIAGNOSIS — F41.1 GAD (GENERALIZED ANXIETY DISORDER): ICD-10-CM

## 2024-01-29 DIAGNOSIS — L30.9 ECZEMA, UNSPECIFIED TYPE: ICD-10-CM

## 2024-01-29 DIAGNOSIS — L21.9 SEBORRHEIC DERMATITIS: ICD-10-CM

## 2024-01-29 RX ORDER — KETOCONAZOLE 20 MG/G
CREAM TOPICAL DAILY
Qty: 30 G | Refills: 1 | Status: SHIPPED | OUTPATIENT
Start: 2024-01-29 | End: 2024-05-16

## 2024-01-29 RX ORDER — BUPROPION HYDROCHLORIDE 300 MG/1
300 TABLET ORAL EVERY MORNING
Qty: 90 TABLET | Refills: 1 | OUTPATIENT
Start: 2024-01-29

## 2024-01-29 RX ORDER — HYDROXYZINE PAMOATE 25 MG/1
25 CAPSULE ORAL 3 TIMES DAILY PRN
Qty: 60 CAPSULE | Refills: 0 | Status: SHIPPED | OUTPATIENT
Start: 2024-01-29

## 2024-01-29 RX ORDER — DESONIDE 0.5 MG/G
CREAM TOPICAL 2 TIMES DAILY
Qty: 15 G | Refills: 1 | Status: SHIPPED | OUTPATIENT
Start: 2024-01-29 | End: 2024-05-16

## 2024-04-03 ENCOUNTER — TRANSFERRED RECORDS (OUTPATIENT)
Dept: HEALTH INFORMATION MANAGEMENT | Facility: CLINIC | Age: 34
End: 2024-04-03

## 2024-04-04 ENCOUNTER — MYC MEDICAL ADVICE (OUTPATIENT)
Dept: FAMILY MEDICINE | Facility: CLINIC | Age: 34
End: 2024-04-04
Payer: COMMERCIAL

## 2024-04-04 DIAGNOSIS — L30.9 ECZEMA, UNSPECIFIED TYPE: Primary | ICD-10-CM

## 2024-04-04 NOTE — TELEPHONE ENCOUNTER
Per pt mychart requesting rx for Triamcinolone Acetonide Cream instead of using the ketoconazole.    Please advise,    Thanks!  Nate Boland RN   [facility]

## 2024-04-07 RX ORDER — TRIAMCINOLONE ACETONIDE 1 MG/G
CREAM TOPICAL 2 TIMES DAILY PRN
Qty: 30 G | Refills: 1 | Status: SHIPPED | OUTPATIENT
Start: 2024-04-07 | End: 2024-05-16

## 2024-04-25 ENCOUNTER — MYC REFILL (OUTPATIENT)
Dept: FAMILY MEDICINE | Facility: CLINIC | Age: 34
End: 2024-04-25
Payer: COMMERCIAL

## 2024-04-25 DIAGNOSIS — F43.23 ADJUSTMENT DISORDER WITH MIXED ANXIETY AND DEPRESSED MOOD: ICD-10-CM

## 2024-04-25 RX ORDER — BUPROPION HYDROCHLORIDE 300 MG/1
300 TABLET ORAL EVERY MORNING
Qty: 90 TABLET | Refills: 1 | Status: SHIPPED | OUTPATIENT
Start: 2024-04-25

## 2024-05-13 SDOH — HEALTH STABILITY: PHYSICAL HEALTH: ON AVERAGE, HOW MANY MINUTES DO YOU ENGAGE IN EXERCISE AT THIS LEVEL?: 90 MIN

## 2024-05-13 SDOH — HEALTH STABILITY: PHYSICAL HEALTH: ON AVERAGE, HOW MANY DAYS PER WEEK DO YOU ENGAGE IN MODERATE TO STRENUOUS EXERCISE (LIKE A BRISK WALK)?: 7 DAYS

## 2024-05-13 ASSESSMENT — SOCIAL DETERMINANTS OF HEALTH (SDOH): HOW OFTEN DO YOU GET TOGETHER WITH FRIENDS OR RELATIVES?: ONCE A WEEK

## 2024-05-15 ENCOUNTER — MEDICAL CORRESPONDENCE (OUTPATIENT)
Dept: HEALTH INFORMATION MANAGEMENT | Facility: CLINIC | Age: 34
End: 2024-05-15
Payer: COMMERCIAL

## 2024-05-16 ENCOUNTER — OFFICE VISIT (OUTPATIENT)
Dept: FAMILY MEDICINE | Facility: CLINIC | Age: 34
End: 2024-05-16
Payer: COMMERCIAL

## 2024-05-16 VITALS
HEIGHT: 69 IN | OXYGEN SATURATION: 97 % | HEART RATE: 88 BPM | TEMPERATURE: 98.2 F | RESPIRATION RATE: 13 BRPM | WEIGHT: 199.5 LBS | DIASTOLIC BLOOD PRESSURE: 86 MMHG | BODY MASS INDEX: 29.55 KG/M2 | SYSTOLIC BLOOD PRESSURE: 122 MMHG

## 2024-05-16 DIAGNOSIS — L21.9 SEBORRHEIC DERMATITIS: ICD-10-CM

## 2024-05-16 DIAGNOSIS — R41.840 ATTENTION AND CONCENTRATION DEFICIT: ICD-10-CM

## 2024-05-16 DIAGNOSIS — Z00.00 ROUTINE GENERAL MEDICAL EXAMINATION AT A HEALTH CARE FACILITY: Primary | ICD-10-CM

## 2024-05-16 DIAGNOSIS — H93.A2 PULSATILE TINNITUS OF LEFT EAR: ICD-10-CM

## 2024-05-16 DIAGNOSIS — F43.23 ADJUSTMENT DISORDER WITH MIXED ANXIETY AND DEPRESSED MOOD: ICD-10-CM

## 2024-05-16 DIAGNOSIS — F41.1 GAD (GENERALIZED ANXIETY DISORDER): ICD-10-CM

## 2024-05-16 DIAGNOSIS — L30.9 ECZEMA, UNSPECIFIED TYPE: ICD-10-CM

## 2024-05-16 PROCEDURE — 99395 PREV VISIT EST AGE 18-39: CPT | Performed by: FAMILY MEDICINE

## 2024-05-16 RX ORDER — KETOCONAZOLE 20 MG/G
CREAM TOPICAL DAILY
Qty: 30 G | Refills: 1 | Status: SHIPPED | OUTPATIENT
Start: 2024-05-16

## 2024-05-16 RX ORDER — DESONIDE 0.5 MG/G
CREAM TOPICAL 2 TIMES DAILY
Qty: 15 G | Refills: 1 | Status: SHIPPED | OUTPATIENT
Start: 2024-05-16

## 2024-05-16 RX ORDER — TRIAMCINOLONE ACETONIDE 1 MG/G
CREAM TOPICAL 2 TIMES DAILY PRN
Qty: 30 G | Refills: 1 | Status: SHIPPED | OUTPATIENT
Start: 2024-05-16

## 2024-05-16 ASSESSMENT — PATIENT HEALTH QUESTIONNAIRE - PHQ9
10. IF YOU CHECKED OFF ANY PROBLEMS, HOW DIFFICULT HAVE THESE PROBLEMS MADE IT FOR YOU TO DO YOUR WORK, TAKE CARE OF THINGS AT HOME, OR GET ALONG WITH OTHER PEOPLE: VERY DIFFICULT
SUM OF ALL RESPONSES TO PHQ QUESTIONS 1-9: 10
SUM OF ALL RESPONSES TO PHQ QUESTIONS 1-9: 10

## 2024-05-16 ASSESSMENT — PAIN SCALES - GENERAL: PAINLEVEL: NO PAIN (0)

## 2024-05-16 NOTE — PROGRESS NOTES
Preventive Care Visit  [facility]  Ian Crane MD, Family Medicine  May 16, 2024      Assessment & Plan     Routine general medical examination at a health care facility  Routine health issues appropriate for age reviewed.  Follow-up is recommended in 1 year for routine physical.    Adjustment disorder with mixed anxiety and depressed mood  Currently on Wellbutrin and weaned off the E citalopram.  He wants to get a little bit more evaluation done as noted below regarding possible ADHD.  He has seen his counselor who feels that there is a high probability of that diagnosis but referred him onto another clinic and that evaluation may have resulted in some discrepancies that his counselor expressed some concerns about.  The patient has copies of these materials including a letter from his counselor expressing concerns regarding the possible diagnosis of ADHD so I asked the patient to upload these documents in the [facility] and then schedule a virtual visit, preferably video, with me a couple weeks later we can review everything.    BRITTANY (generalized anxiety disorder)  Feels that this is related to underlying attention and concentration issues.  Continuing on the Wellbutrin at this time but is open to restarting the Lexapro or having a discussion regarding other treatment options once I have a chance to review some of the other information.    Attention and concentration deficit  He will update me with the records that he has available from his counselor in the outside ADHD evaluation and will proceed from there.  - Adult ENT  Referral; Future    Pulsatile tinnitus of left ear  Both ears are normal on exam today.  Symptoms been present intermittently for about the last 6 months and so referral was placed to ENT for further evaluation.    Eczema, unspecified type  He asked for refill on some of his creams today but was not sure exactly which ones we chose to refill  "all 3 including those used for eczema and seborrheic dermatitis.  - desonide (DESOWEN) 0.05 % external cream; Apply topically 2 times daily  - triamcinolone (KENALOG) 0.1 % external cream; Apply topically 2 times daily as needed for irritation To rash on chest and arms    Seborrheic dermatitis  As above.  - ketoconazole (NIZORAL) 2 % external cream; Apply topically daily    Patient has been advised of split billing requirements and indicates understanding: Yes        BMI  Estimated body mass index is 29.75 kg/m  as calculated from the following:    Height as of this encounter: 1.744 m (5' 8.66\").    Weight as of this encounter: 90.5 kg (199 lb 8 oz).   Weight management plan: Discussed healthy diet and exercise guidelines    Counseling  Appropriate preventive services were discussed with this patient, including applicable screening as appropriate for fall prevention, nutrition, physical activity, Tobacco-use cessation, weight loss and cognition.  Checklist reviewing preventive services available has been given to the patient.  Reviewed patient's diet, addressing concerns and/or questions.   The patient reports drinking more than one alcoholic drink per day and sometimes engages in binge or excessive drinking. The patient was counseled and given information about possible harmful effects of excessive alcohol intake as well as where to get help for alcohol problems. The patient's PHQ-9 score is consistent with moderate depression. He was provided with information regarding depression.       FUTURE APPOINTMENTS:       - Follow-up visit in 1 to 2 months for video visit to discuss attention concerns and review outside evaluation.    Yash Sheriff is a 33 year old, presenting for the following:  Physical        5/16/2024     9:09 AM   Additional Questions   Roomed by Tonya KELLY        Health Care Directive  Patient does not have a Health Care Directive or Living Will: Discussed advance care planning with patient; " however, patient declined at this time.    HPI        Patient is here today for routine physical.  He expressed an interest in further evaluation for ADHD based on a recommendation of his counselor.  He brought in a letter today documenting some of the symptoms and concerns that his counselor is observed.  He did go to an outside group and have an evaluation done for ADHD but the results of that evaluation apparently deferred significantly with the impressions of his usual counselor enough that his usual counselor expressed concern as to whether or not the evaluation pertaining to the same patient.  The patient is willing to upload copies of records that he has available so that I can review those and we can schedule a visit in the future to discuss the symptoms.  There are a lot of symptoms that include lifelong issues with concentration, attention, fidgetiness, disorganization, task completion, and he feels like many of the symptoms have contributed to increased anxiety and depressive symptoms over the years. He weaned off Lexapro about a year ago.        He ended up having a vasectomy last fall through [facility].  Both he and his partner are very confident that they do not want any children.    He was requesting a refill on some creams today that he uses for various rashes.        5/13/2024   General Health   How would you rate your overall physical health? Excellent   Feel stress (tense, anxious, or unable to sleep) To some extent   (!) STRESS CONCERN      5/13/2024   Nutrition   Three or more servings of calcium each day? Yes   Diet: Regular (no restrictions)   How many servings of fruit and vegetables per day? (!) 2-3   How many sweetened beverages each day? 0-1         5/13/2024   Exercise   Days per week of moderate/strenous exercise 7 days   Average minutes spent exercising at this level 90 min         5/13/2024   Social Factors   Frequency of gathering with friends or relatives Once a week   Worry  food won't last until get money to buy more No   Food not last or not have enough money for food? No   Do you have housing?  Yes   Are you worried about losing your housing? No   Lack of transportation? No   Unable to get utilities (heat,electricity)? No         5/13/2024   Dental   Dentist two times every year? Yes         5/13/2024   TB Screening   Were you born outside of the [place]? No       Today's PHQ-9 Score:       5/16/2024     8:23 AM   PHQ-9 SCORE   PHQ-9 Total Score MyChart 10 (Moderate depression)   PHQ-9 Total Score 10         5/13/2024   Substance Use   Alcohol more than 3/day or more than 7/wk Yes   How often do you have a drink containing alcohol 4 or more times a week   How many alcohol drinks on typical day 3 or 4   How often do you have 5+ drinks at one occasion Monthly   Audit 2/3 Score 3   How often not able to stop drinking once started Never   How often failed to do what normally expected Never   How often needed first drink in am after a heavy drinking session Never   How often feeling of guilt or remorse after drinking Never   How often unable to remember what happened the night before Never   Have you or someone else been injured because of your drinking No   Has anyone been concerned or suggested you cut down on drinking No   TOTAL SCORE - AUDIT 7   Do you use any other substances recreationally? (!) ALCOHOL    (!) OTHER     Social History     Tobacco Use    Smoking status: Never    Smokeless tobacco: Never   Vaping Use    Vaping status: Never Used   Substance Use Topics    Alcohol use: Yes     Alcohol/week: 14.0 standard drinks of alcohol     Types: 14 Cans of beer per week    Drug use: Yes     Types: Cocaine, MDMA (Ecstasy), LSD     Comment: occasional use           5/13/2024   STI Screening   New sexual partner(s) since last STI/HIV test? No         5/13/2024   Contraception/Family Planning   Questions about contraception or family planning No        Reviewed and updated as needed this  visit by Provider   Tobacco  Allergies  Meds  Problems  Med Hx  Surg Hx  Fam Hx  Soc   Hx Sexual Activity          Patient Active Problem List   Diagnosis    Adjustment disorder with mixed anxiety and depressed mood    Cocaine abuse (H)    Moderate major depression (H)    Alcohol abuse, episodic drinking behavior    BRITTANY (generalized anxiety disorder)     Past Surgical History:   Procedure Laterality Date    HC TOOTH EXTRACTION W/FORCEP  12/28/2010    [place] teeth    VASECTOMY  10/2023       Social History     Tobacco Use    Smoking status: Never    Smokeless tobacco: Never   Substance Use Topics    Alcohol use: Yes     Alcohol/week: 14.0 standard drinks of alcohol     Types: 14 Cans of beer per week     Family History   Problem Relation Age of Onset    Substance Abuse Mother     Anxiety Disorder Mother     Depression Mother     Mental Illness Mother     Other - See Comments Mother         unknown cause of death, age 56    Alcoholism Mother     Diabetes Type 2  Mother     Depression Father     Anxiety Disorder Father     Hypertension Father     Depression Sister     Anxiety Disorder Sister     Depression Sister     Anxiety Disorder Sister     Depression Brother     Anxiety Disorder Brother          Current Outpatient Medications   Medication Sig Dispense Refill    buPROPion (WELLBUTRIN XL) 300 MG 24 hr tablet Take 1 tablet (300 mg) by mouth every morning 90 tablet 1    cholecalciferol (VITAMIN D3) 125 mcg (5000 units) capsule Take 1 capsule (125 mcg) by mouth daily 90 capsule 3    desonide (DESOWEN) 0.05 % external cream Apply topically 2 times daily 15 g 1    hydrOXYzine ponce (VISTARIL) 25 MG capsule Take 1 capsule (25 mg) by mouth 3 times daily as needed for anxiety 60 capsule 0    ketoconazole (NIZORAL) 2 % external cream Apply topically daily 30 g 1    levocetirizine (XYZAL) 5 MG tablet Take 1 tablet (5 mg) by mouth every evening 90 tablet 3    Omega-3 Fatty Acids (FISH OIL) 1200 MG capsule Take 1,200 mg  "by mouth daily      triamcinolone (KENALOG) 0.1 % external cream Apply topically 2 times daily as needed for irritation To rash on chest and arms 30 g 1     Allergies   Allergen Reactions    Cats     Dust Mites     Grass     Mugwort [Artemisia Vulgaris]     Ragweeds     Trees          Review of Systems  Constitutional, HEENT, cardiovascular, pulmonary, GI, , musculoskeletal, neuro, skin, endocrine and psych systems are negative, except as otherwise noted.     Objective    Exam  /86 (BP Location: Left arm, Patient Position: Sitting, Cuff Size: Adult Regular)   Pulse 88   Temp 98.2  F (36.8  C) (Temporal)   Resp 13   Ht 1.744 m (5' 8.66\")   Wt 90.5 kg (199 lb 8 oz)   SpO2 97%   BMI 29.75 kg/m     Estimated body mass index is 29.75 kg/m  as calculated from the following:    Height as of this encounter: 1.744 m (5' 8.66\").    Weight as of this encounter: 90.5 kg (199 lb 8 oz).    Physical Exam  GENERAL: alert and no distress  EYES: Eyes grossly normal to inspection, PERRL and conjunctivae and sclerae normal  HENT: ear canals and TM's normal, nose and mouth without ulcers or lesions  NECK: no adenopathy, no asymmetry, masses, or scars  RESP: lungs clear to auscultation - no rales, rhonchi or wheezes  CV: regular rate and rhythm, normal S1 S2, no S3 or S4, no murmur, click or rub, no peripheral edema  ABDOMEN: soft, nontender, no hepatosplenomegaly, no masses and bowel sounds normal   (male): normal male genitalia without lesions or urethral discharge, no hernia  MS: no gross musculoskeletal defects noted, no edema  SKIN: no suspicious lesions or rashes  NEURO: Normal strength and tone, mentation intact and speech normal  PSYCH: mentation appears normal, affect normal/bright        Signed Electronically by: Ian Crane MD    "

## 2024-05-16 NOTE — PATIENT INSTRUCTIONS
"Preventive Care Advice   This is general advice we often give to help people stay healthy. Your care team may have specific advice just for you. Please talk to your care team about your own preventive care needs.  Lifestyle  Exercise at least 150 minutes each week (30 minutes a day, 5 days a week).  Do muscle strengthening activities 2 days a week. These help control your weight and prevent disease.  No smoking.  Wear sunscreen to prevent skin cancer.  Have your home tested for radon every 2 to 5 years. Radon is a colorless, odorless gas that can harm your lungs. To learn more, go to www.health.[facility].mn. and search for \"Radon in Homes.\"  Keep guns unloaded and locked up in a safe place like a safe or gun vault, or, use a gun lock and hide the keys. Always lock away bullets separately. To learn more, visit [place].gov and search for \"safe gun storage.\"  Nutrition  Eat 5 or more servings of fruits and vegetables each day.  Try wheat bread, brown rice and whole grain pasta (instead of white bread, rice, and pasta).  Get enough calcium and vitamin D. Check the label on foods and aim for 100% of the RDA (recommended daily allowance).  Regular exams  Have a dental exam and cleaning every 6 months.  See your health care team every year to talk about:  Any changes in your health.  Any medicines your care team has prescribed.  Preventive care, family planning, and ways to prevent chronic diseases.  Shots (vaccines)   HPV shots (up to age 26), if you've never had them before.  Hepatitis B shots (up to age 59), if you've never had them before.  COVID-19 shot: Get this shot when it's due.  Flu shot: Get a flu shot every year.  Tetanus shot: Get a tetanus shot every 10 years.  Pneumococcal, hepatitis A, and RSV shots: Ask your care team if you need these based on your risk.  Shingles shot (for age 50 and up).  General health tests  Diabetes screening:  Starting at age 35, Get screened for diabetes at least every 3 years.  If " you are younger than age 35, ask your care team if you should be screened for diabetes.  Cholesterol test: At age 39, start having a cholesterol test every 5 years, or more often if advised.  Bone density scan (DEXA): At age 50, ask your care team if you should have this scan for osteoporosis (brittle bones).  Hepatitis C: Get tested at least once in your life.  Abdominal aortic aneurysm screening: Talk to your doctor about having this screening if you:  Have ever smoked; and  Are biologically male; and  Are between the ages of 65 and 75.  STIs (sexually transmitted infections)  Before age 24: Ask your care team if you should be screened for STIs.  After age 24: Get screened for STIs if you're at risk. You are at risk for STIs (including HIV) if:  You are sexually active with more than one person.  You don't use condoms every time.  You or a partner was diagnosed with a sexually transmitted infection.  If you are at risk for HIV, ask about PrEP medicine to prevent HIV.  Get tested for HIV at least once in your life, whether you are at risk for HIV or not.  Cancer screening tests  Cervical cancer screening: If you have a cervix, begin getting regular cervical cancer screening tests at age 21. Most people who have regular screenings with normal results can stop after age 65. Talk about this with your provider.  Breast cancer scan (mammogram): If you've ever had breasts, begin having regular mammograms starting at age 40. This is a scan to check for breast cancer.  Colon cancer screening: It is important to start screening for colon cancer at age 45.  Have a colonoscopy test every 10 years (or more often if you're at risk) Or, ask your provider about stool tests like a FIT test every year or Cologuard test every 3 years.  To learn more about your testing options, visit: www.[employer]/078649.pdf.  For help making a decision, visit: estevan/[email or web address].  Prostate cancer screening test: If you have a prostate and are age 55  to 69, ask your provider if you would benefit from a yearly prostate cancer screening test.  Lung cancer screening: If you are a current or former smoker age 50 to 80, ask your care team if ongoing lung cancer screenings are right for you.  For informational purposes only. Not to replace the advice of your health care provider. Copyright   2023 [place] [employer]". All rights reserved. Clinically reviewed by the [facility]. [employer] 756357 - REV 04/24.    Learning About Stress  What is stress?     Stress is your body's response to a hard situation. Your body can have a physical, emotional, or mental response. Stress is a fact of life for most people, and it affects everyone differently. What causes stress for you may not be stressful for someone else.  A lot of things can cause stress. You may feel stress when you go on a job interview, take a test, or run a race. This kind of short-term stress is normal and even useful. It can help you if you need to work hard or react quickly. For example, stress can help you finish an important job on time.  Long-term stress is caused by ongoing stressful situations or events. Examples of long-term stress include long-term health problems, ongoing problems at work, or conflicts in your family. Long-term stress can harm your health.  How does stress affect your health?  When you are stressed, your body responds as though you are in danger. It makes hormones that speed up your heart, make you breathe faster, and give you a burst of energy. This is called the fight-or-flight stress response. If the stress is over quickly, your body goes back to normal and no harm is done.  But if stress happens too often or lasts too long, it can have bad effects. Long-term stress can make you more likely to get sick, and it can make symptoms of some diseases worse. If you tense up when you are stressed, you may develop neck, shoulder, or low back pain. Stress is  linked to high blood pressure and heart disease.  Stress also harms your emotional health. It can make you peterson, tense, or depressed. Your relationships may suffer, and you may not do well at work or school.  What can you do to manage stress?  You can try these things to help manage stress:   Do something active. Exercise or activity can help reduce stress. Walking is a great way to get started. Even everyday activities such as housecleaning or yard work can help.  Try yoga or chente chi. These techniques combine exercise and meditation. You may need some training at first to learn them.  Do something you enjoy. For example, listen to music or go to a movie. Practice your hobby or do volunteer work.  Meditate. This can help you relax, because you are not worrying about what happened before or what may happen in the future.  Do guided imagery. Imagine yourself in any setting that helps you feel calm. You can use online videos, books, or a teacher to guide you.  Do breathing exercises. For example:  From a standing position, bend forward from the waist with your knees slightly bent. Let your arms dangle close to the floor.  Breathe in slowly and deeply as you return to a standing position. Roll up slowly and lift your head last.  Hold your breath for just a few seconds in the standing position.  Breathe out slowly and bend forward from the waist.  Let your feelings out. Talk, laugh, cry, and express anger when you need to. Talking with supportive friends or family, a counselor, or a megan leader about your feelings is a healthy way to relieve stress. Avoid discussing your feelings with people who make you feel worse.  Write. It may help to write about things that are bothering you. This helps you find out how much stress you feel and what is causing it. When you know this, you can find better ways to cope.  What can you do to prevent stress?  You might try some of these things to help prevent stress:  Manage your time.  "This helps you find time to do the things you want and need to do.  Get enough sleep. Your body recovers from the stresses of the day while you are sleeping.  Get support. Your family, friends, and community can make a difference in how you experience stress.  Limit your news feed. Avoid or limit time on social media or news that may make you feel stressed.  Do something active. Exercise or activity can help reduce stress. Walking is a great way to get started.  Where can you learn more?  Go to https://www.[employer].net/patiented  Enter N032 in the search box to learn more about \"Learning About Stress.\"  Current as of: October 24, 2023               Content Version: 14.0    6723-7584 [employer]   Care instructions adapted under license by your healthcare professional. If you have questions about a medical condition or this instruction, always ask your healthcare professional. [employer] disclaims any warranty or liability for your use of this information.      Learning About Depression Screening  What is depression screening?  Depression screening is a way to see if you have depression symptoms. It may be done by a doctor or counselor. It's often part of a routine checkup. That's because your mental health is just as important as your physical health.  Depression is a mental health condition that affects how you feel, think, and act. You may:  Have less energy.  Lose interest in your daily activities.  Feel sad and grouchy for a long time.  Depression is very common. It affects people of all ages.  Many things can lead to depression. Some people become depressed after they have a stroke or find out they have a major illness like cancer or heart disease. The death of a loved one or a breakup may lead to depression. It can run in families. Most experts believe that a combination of inherited genes and stressful life events can cause it.  What happens during screening?  You may be asked to " "fill out a form about your depression symptoms. You and the doctor will discuss your answers. The doctor may ask you more questions to learn more about how you think, act, and feel.  What happens after screening?  If you have symptoms of depression, your doctor will talk to you about your options.  Doctors usually treat depression with medicines or counseling. Often, combining the two works best. Many people don't get help because they think that they'll get over the depression on their own. But people with depression may not get better unless they get treatment.  The cause of depression is not well understood. There may be many factors involved. But if you have depression, it's not your fault.  A serious symptom of depression is thinking about death or suicide. If you or someone you care about talks about this or about feeling hopeless, get help right away.  It's important to know that depression can be treated. Medicine, counseling, and self-care may help.  Where can you learn more?  Go to https://www.[employer].net/patiented  Enter T185 in the search box to learn more about \"Learning About Depression Screening.\"  Current as of: June 24, 2023               Content Version: 14.0    2693-5964 [employer].   Care instructions adapted under license by your healthcare professional. If you have questions about a medical condition or this instruction, always ask your healthcare professional. [employer] disclaims any warranty or liability for your use of this information.      Learning About Alcohol Use Disorder  What is alcohol use disorder?  Alcohol use disorder means that a person drinks alcohol even though it causes harm to themselves or others. It can range from mild to severe. The more symptoms of this disorder you have, the more severe it may be. People who have it may find it hard to control their use of alcohol.  People who have this disorder may argue with others about how much they're " drinking. Their job may be affected because of drinking. They may drink when it's dangerous or illegal, such as when they drive. They also may have a strong need, or craving, to drink. They may feel like they must drink just to get by. Their drinking may increase their risk of getting hurt or being in a car crash.  Over time, drinking too much alcohol may cause health problems. These may include high blood pressure, liver problems, or problems with digestion.  What are the symptoms?  Maybe you've wondered about your alcohol habits or how to tell if your drinking is becoming a problem.  Here are some of the symptoms of alcohol use disorder. You may have it if you have two or more of the following symptoms:  You drink larger amounts of alcohol than you ever meant to. Or you've been drinking for a longer time than you ever meant to.  You can't cut down or control your use. Or you constantly wish you could cut down.  You spend a lot of time getting or drinking alcohol or recovering from its effects.  You have strong cravings for alcohol.  You can no longer do your main jobs at work, at school, or at home.  You keep drinking alcohol, even though your use hurts your relationships.  You have stopped doing important activities because of your alcohol use.  You drink alcohol in situations where doing so is dangerous.  You keep drinking alcohol even though you know it's causing health problems.  You need more and more alcohol to get the same effect, or you get less effect from the same amount over time. This is called tolerance.  You have uncomfortable symptoms when you stop drinking alcohol or use less. This is called withdrawal.  Alcohol use disorder can range from mild to severe. The more symptoms you have, the more severe the disorder may be.  You might not realize that your drinking is a problem. You might not drink large amounts when you drink. Or you might go for days or weeks between drinking episodes. But even if you  "don't drink very often, your drinking could still be harmful and put you at risk.  How is alcohol use disorder treated?  Getting help is up to you. But you don't have to do it alone. There are many people and kinds of treatments that can help.  Treatment for alcohol use disorder can include:  Group therapy, one or more types of counseling, and alcohol education.  Medicines that help to:  Reduce withdrawal symptoms and help you safely stop drinking.  Reduce cravings for alcohol.  Support groups. These groups include Alcoholics Anonymous and [employer] (Self-Management and Recovery Training).  Some people are able to stop or cut back on drinking with help from a counselor. People who have moderate to severe alcohol use disorder may need medical treatment. They may need to stay in a hospital or treatment center.  You may have a treatment team to help you. This team may include a psychologist or psychiatrist, counselors, doctors, social workers, nurses, and a . A  helps plan and manage your treatment.  Follow-up care is a key part of your treatment and safety. Be sure to make and go to all appointments, and call your doctor if you are having problems. It's also a good idea to know your test results and keep a list of the medicines you take.  Where can you learn more?  Go to https://www.healthGetBack.net/patiented  Enter H758 in the search box to learn more about \"Learning About Alcohol Use Disorder.\"  Current as of: November 15, 2023               Content Version: 14.0    7655-9657 [employer].   Care instructions adapted under license by your healthcare professional. If you have questions about a medical condition or this instruction, always ask your healthcare professional. [employer] disclaims any warranty or liability for your use of this information.      Substance Use Disorder: Care Instructions  Overview     You can improve your life and health by stopping your " use of alcohol or drugs. When you don't drink or use drugs, you may feel and sleep better. You may get along better with your family, friends, and coworkers. There are medicines and programs that can help with substance use disorder.  How can you care for yourself at home?  Here are some ways to help you stay sober and prevent relapse.  If you have been given medicine to help keep you sober or reduce your cravings, be sure to take it exactly as prescribed.  Talk to your doctor about programs that can help you stop using drugs or drinking alcohol.  Do not keep alcohol or drugs in your home.  Plan ahead. Think about what you'll say if other people ask you to drink or use drugs. Try not to spend time with people who drink or use drugs.  Use the time and money spent on drinking or drugs to do something that's important to you.  Preventing a relapse  Have a plan to deal with relapse. Learn to recognize changes in your thinking that lead you to drink or use drugs. Get help before you start to drink or use drugs again.  Try to stay away from situations, friends, or places that may lead you to drink or use drugs.  If you feel the need to drink alcohol or use drugs again, seek help right away. Call a trusted friend or family member. Some people get support from organizations such as Narcotics Anonymous or [employer] or from treatment facilities.  If you relapse, get help as soon as you can. Some people make a plan with another person that outlines what they want that person to do for them if they relapse. The plan usually includes how to handle the relapse and who to notify in case of relapse.  Don't give up. Remember that a relapse doesn't mean that you have failed. Use the experience to learn the triggers that lead you to drink or use drugs. Then quit again. Recovery is a lifelong process. Many people have several relapses before they are able to quit for good.  Follow-up care is a key part of your treatment and  "safety. Be sure to make and go to all appointments, and call your doctor if you are having problems. It's also a good idea to know your test results and keep a list of the medicines you take.  When should you call for help?   Call 911  anytime you think you may need emergency care. For example, call if you or someone else:    Has overdosed or has withdrawal signs. Be sure to tell the emergency workers that you are or someone else is using or trying to quit using drugs. Overdose or withdrawal signs may include:  Losing consciousness.  Seizure.  Seeing or hearing things that aren't there (hallucinations).     Is thinking or talking about suicide or harming others.   Where to get help 24 hours a day, 7 days a week   If you or someone you know talks about suicide, self-harm, a mental health crisis, a substance use crisis, or any other kind of emotional distress, get help right away. You can:    Call the Suicide and Crisis Lifeline at 855.     Call 5-552-340-TALK (1-755.390.6962).     Text HOME to 106816 to access the Crisis Text Line.   Consider saving these numbers in your phone.  Go to [employer] for more information or to chat online.  Call your doctor now or seek immediate medical care if:    You are having withdrawal symptoms. These may include nausea or vomiting, sweating, shakiness, and anxiety.   Watch closely for changes in your health, and be sure to contact your doctor if:    You have a relapse.     You need more help or support to stop.   Where can you learn more?  Go to https://www.[employer].net/patiented  Enter H573 in the search box to learn more about \"Substance Use Disorder: Care Instructions.\"  Current as of: November 15, 2023               Content Version: 14.0    6803-6592 [employer].   Care instructions adapted under license by your healthcare professional. If you have questions about a medical condition or this instruction, always ask your healthcare professional. [employer] disclaims any warranty or liability for your use of this information.

## 2024-05-22 ENCOUNTER — MYC MEDICAL ADVICE (OUTPATIENT)
Dept: FAMILY MEDICINE | Facility: CLINIC | Age: 34
End: 2024-05-22
Payer: COMMERCIAL

## 2024-06-03 ENCOUNTER — VIRTUAL VISIT (OUTPATIENT)
Dept: FAMILY MEDICINE | Facility: CLINIC | Age: 34
End: 2024-06-03
Payer: COMMERCIAL

## 2024-06-03 DIAGNOSIS — F41.1 GAD (GENERALIZED ANXIETY DISORDER): ICD-10-CM

## 2024-06-03 DIAGNOSIS — F33.1 MODERATE EPISODE OF RECURRENT MAJOR DEPRESSIVE DISORDER (H): ICD-10-CM

## 2024-06-03 DIAGNOSIS — F14.11 COCAINE ABUSE, IN REMISSION (H): ICD-10-CM

## 2024-06-03 DIAGNOSIS — F90.2 ADHD (ATTENTION DEFICIT HYPERACTIVITY DISORDER), COMBINED TYPE: Primary | ICD-10-CM

## 2024-06-03 PROBLEM — F33.9 MAJOR DEPRESSION, RECURRENT (H): Status: ACTIVE | Noted: 2024-06-03

## 2024-06-03 PROCEDURE — 99214 OFFICE O/P EST MOD 30 MIN: CPT | Mod: 95 | Performed by: FAMILY MEDICINE

## 2024-06-03 RX ORDER — LISDEXAMFETAMINE DIMESYLATE 20 MG/1
20 CAPSULE ORAL EVERY MORNING
Qty: 10 CAPSULE | Refills: 0 | Status: SHIPPED | OUTPATIENT
Start: 2024-06-03 | End: 2024-06-13

## 2024-06-03 NOTE — PROGRESS NOTES
Jaziel is a 33 year old who is being evaluated via a billable video visit.    How would you like to obtain your AVS? MyChart  If the video visit is dropped, the invitation should be resent by: Text to cell phone: [phone]  Will anyone else be joining your video visit? No      Assessment & Plan     ADHD (attention deficit hyperactivity disorder), combined type  I reviewed the papers submitted by the patient and I do believe they are consistent with a diagnosis of ADHD combined type given the presence of inattentiveness and hyperactivity.  The patient I talked today about how these could be symptoms consistent with incompletely treated depression as well.  The corroborative information obtained by the psychologist conducting the ADHD evaluation took into account some information provided by the patient's father, but that information was incomplete.  I have also taken into account information provided to me by the patient's longer-term therapist.  I discussed with the patient that it is common for ADHD to have comorbid depression or anxiety but also that depression and anxiety can have significant issues with concentration and organizational deficits although not usually hyperactivity as the patient manifests.  I explained to him that stimulants can be used for adjunct of treatment for incompletely responsive major depression.  We talked about different medication categories including but not limited to stimulants and other for the treatment of ADHD with specific attention and the other to atomoxetine and 1 fasting.  Side effects were reviewed.  He was interested in a trial of Vyvanse.  A short-term prescription was sent to the pharmacy.  Side effects were reviewed with the patient.  Also understanding that these medications may or may not be covered and so we might have to work through a prior authorization process but until we get that information I would not know what other agents he may need to try before  proceeding to something like Vyvanse.  If he is able to fill the prescription then he can update me next week on how things are going so we can do some dose adjustment.  If it requires other information, then we will work on that.  - lisdexamfetamine (VYVANSE) 20 MG capsule; Take 1 capsule (20 mg) by mouth every morning    BRITTANY (generalized anxiety disorder)  Symptoms are stable at this time.    Moderate episode of recurrent major depressive disorder (H)  Symptoms are stable and he continues on Wellbutrin and working with a counselor.    Cocaine abuse, in remission (H)  Patient admits to infrequent use of cocaine and I would discourage use in conjunction with the stimulant.            FUTURE APPOINTMENTS:       - Follow-up visit in 1 to 2 weeks over Leoncio Berrios   Jaziel is a 33 year old, presenting for the following health issues:  A.D.H.D      6/3/2024     5:23 PM   Additional Questions   Roomed by vanessa CONDE     Patient presents today for video visit for review of information that he was provided regarding a potential diagnosis of ADHD.  I reviewed this information.  The psychological consultant who conducted an ADHD evaluation stated that there were sub-syndromic symptoms of ADHD that he was attributing to partially treated depression and anxiety.  The therapist has been seeing the patient for an extended period of time has expressed concerns regarding the severity of his symptoms and feels that they represent a manifestation of ADHD combined type.  Based on the information presented to me I think it is reasonable today to consider the diagnosis of ADHD combined type and to consider treatment focused on that while also addressing the symptoms of depression and anxiety      Review of Systems  Constitutional, HEENT, cardiovascular, pulmonary, gi and gu systems are negative, except as otherwise noted.      Objective           Vitals:  No vitals were obtained today due to virtual visit.    Physical  Exam   GENERAL: alert and no distress  EYES: Eyes grossly normal to inspection.  No discharge or erythema, or obvious scleral/conjunctival abnormalities.  RESP: No audible wheeze, cough, or visible cyanosis.    SKIN: Visible skin clear. No significant rash, abnormal pigmentation or lesions.  NEURO: Cranial nerves grossly intact.  Mentation and speech appropriate for age.  PSYCH: Appropriate affect, tone, and pace of words          Video-Visit Details    Type of service:  Video Visit   Originating Location (pt. Location): Home    Distant Location (provider location):  On-site  Platform used for Video Visit: Woody  Signed Electronically by: Ian Crane MD

## 2024-06-13 ENCOUNTER — MYC MEDICAL ADVICE (OUTPATIENT)
Dept: FAMILY MEDICINE | Facility: CLINIC | Age: 34
End: 2024-06-13
Payer: COMMERCIAL

## 2024-06-13 DIAGNOSIS — F90.2 ADHD (ATTENTION DEFICIT HYPERACTIVITY DISORDER), COMBINED TYPE: ICD-10-CM

## 2024-06-13 RX ORDER — LISDEXAMFETAMINE DIMESYLATE 20 MG/1
20 CAPSULE ORAL EVERY MORNING
Qty: 30 CAPSULE | Refills: 0 | Status: SHIPPED | OUTPATIENT
Start: 2024-06-13 | End: 2024-07-14

## 2024-07-11 ENCOUNTER — MYC MEDICAL ADVICE (OUTPATIENT)
Dept: FAMILY MEDICINE | Facility: CLINIC | Age: 34
End: 2024-07-11
Payer: COMMERCIAL

## 2024-07-11 DIAGNOSIS — F90.2 ADHD (ATTENTION DEFICIT HYPERACTIVITY DISORDER), COMBINED TYPE: ICD-10-CM

## 2024-07-14 RX ORDER — LISDEXAMFETAMINE DIMESYLATE 30 MG/1
30 CAPSULE ORAL EVERY MORNING
Qty: 30 CAPSULE | Refills: 0 | Status: SHIPPED | OUTPATIENT
Start: 2024-07-14 | End: 2024-07-17

## 2024-07-17 RX ORDER — LISDEXAMFETAMINE DIMESYLATE 30 MG/1
30 CAPSULE ORAL EVERY MORNING
Qty: 30 CAPSULE | Refills: 0 | Status: SHIPPED | OUTPATIENT
Start: 2024-07-17 | End: 2024-08-14

## 2024-07-30 ENCOUNTER — MYC MEDICAL ADVICE (OUTPATIENT)
Dept: FAMILY MEDICINE | Facility: CLINIC | Age: 34
End: 2024-07-30
Payer: COMMERCIAL

## 2024-07-30 ENCOUNTER — MYC REFILL (OUTPATIENT)
Dept: FAMILY MEDICINE | Facility: CLINIC | Age: 34
End: 2024-07-30
Payer: COMMERCIAL

## 2024-07-30 DIAGNOSIS — F43.23 ADJUSTMENT DISORDER WITH MIXED ANXIETY AND DEPRESSED MOOD: ICD-10-CM

## 2024-07-30 RX ORDER — BUPROPION HYDROCHLORIDE 300 MG/1
300 TABLET ORAL EVERY MORNING
Qty: 90 TABLET | Refills: 1 | OUTPATIENT
Start: 2024-07-30

## 2024-08-14 ENCOUNTER — MYC MEDICAL ADVICE (OUTPATIENT)
Dept: FAMILY MEDICINE | Facility: CLINIC | Age: 34
End: 2024-08-14
Payer: COMMERCIAL

## 2024-08-14 DIAGNOSIS — F90.2 ADHD (ATTENTION DEFICIT HYPERACTIVITY DISORDER), COMBINED TYPE: ICD-10-CM

## 2024-08-14 RX ORDER — LISDEXAMFETAMINE DIMESYLATE 30 MG/1
30 CAPSULE ORAL EVERY MORNING
Qty: 30 CAPSULE | Refills: 0 | Status: SHIPPED | OUTPATIENT
Start: 2024-08-14 | End: 2024-09-11

## 2024-09-11 ENCOUNTER — MYC REFILL (OUTPATIENT)
Dept: FAMILY MEDICINE | Facility: CLINIC | Age: 34
End: 2024-09-11
Payer: COMMERCIAL

## 2024-09-11 DIAGNOSIS — F90.2 ADHD (ATTENTION DEFICIT HYPERACTIVITY DISORDER), COMBINED TYPE: ICD-10-CM

## 2024-09-11 RX ORDER — LISDEXAMFETAMINE DIMESYLATE 30 MG/1
30 CAPSULE ORAL EVERY MORNING
Qty: 30 CAPSULE | Refills: 0 | Status: SHIPPED | OUTPATIENT
Start: 2024-09-11

## 2024-10-04 ENCOUNTER — OFFICE VISIT (OUTPATIENT)
Dept: AUDIOLOGY | Facility: CLINIC | Age: 34
End: 2024-10-04
Payer: COMMERCIAL

## 2024-10-04 ENCOUNTER — OFFICE VISIT (OUTPATIENT)
Dept: OTOLARYNGOLOGY | Facility: CLINIC | Age: 34
End: 2024-10-04
Payer: COMMERCIAL

## 2024-10-04 DIAGNOSIS — R41.840 ATTENTION AND CONCENTRATION DEFICIT: ICD-10-CM

## 2024-10-04 DIAGNOSIS — H90.42 SENSORINEURAL HEARING LOSS (SNHL) OF LEFT EAR WITH UNRESTRICTED HEARING OF RIGHT EAR: Primary | ICD-10-CM

## 2024-10-04 DIAGNOSIS — H93.A2 PULSATILE TINNITUS OF LEFT EAR: Primary | ICD-10-CM

## 2024-10-04 PROCEDURE — 92557 COMPREHENSIVE HEARING TEST: CPT | Performed by: AUDIOLOGIST

## 2024-10-04 PROCEDURE — 92550 TYMPANOMETRY & REFLEX THRESH: CPT | Performed by: AUDIOLOGIST

## 2024-10-04 PROCEDURE — 99203 OFFICE O/P NEW LOW 30 MIN: CPT | Performed by: OTOLARYNGOLOGY

## 2024-10-04 ASSESSMENT — ENCOUNTER SYMPTOMS
GASTROINTESTINAL NEGATIVE: 1
RESPIRATORY NEGATIVE: 1
BLURRED VISION: 0
EYES NEGATIVE: 1
DOUBLE VISION: 0
DIZZINESS: 1
CONSTITUTIONAL NEGATIVE: 1

## 2024-10-04 ASSESSMENT — PAIN SCALES - GENERAL: PAINLEVEL: NO PAIN (0)

## 2024-10-04 NOTE — NURSING NOTE
Bib Cates's goals for this visit include:   Chief Complaint   Patient presents with    pulsatile tinnitus     X 10 months, prev TMJ now under control , worse when allergies flare       He requests these members of his care team be copied on today's visit information:     PCP: Ian Crane    Referring Provider:  Referred Self, MD  No address on file    There were no vitals taken for this visit.    Do you need any medication refills at today's visit? No    Briana Chakraborty RN

## 2024-10-04 NOTE — PROGRESS NOTES
AUDIOLOGY REPORT    SUMMARY: Audiology visit completed. See audiogram for results.    RECOMMENDATIONS: Follow-up with ENT.    Mervat Moses  Doctor of Audiology  [place] License # [id number]

## 2024-10-04 NOTE — PROGRESS NOTES
Chief Complaint   Patient presents with    pulsatile tinnitus     X 10 months, prev TMJ now under control , worse when allergies flare      PCP: Ian Crane     Referring Provider: Referred Self    There were no vitals taken for this visit.    ENT Problem List:  Patient Active Problem List   Diagnosis    Adjustment disorder with mixed anxiety and depressed mood    Cocaine abuse, in remission (H)    Moderate major depression (H)    Alcohol abuse, episodic drinking behavior    BRITTANY (generalized anxiety disorder)    Major depression, recurrent (H)    Moderate episode of recurrent major depressive disorder (H)      Current Medications:  Current Outpatient Medications   Medication Sig Dispense Refill    buPROPion (WELLBUTRIN XL) 300 MG 24 hr tablet Take 1 tablet (300 mg) by mouth every morning 90 tablet 1    cholecalciferol (VITAMIN D3) 125 mcg (5000 units) capsule Take 1 capsule (125 mcg) by mouth daily 90 capsule 3    desonide (DESOWEN) 0.05 % external cream Apply topically 2 times daily 15 g 1    hydrOXYzine ponce (VISTARIL) 25 MG capsule Take 1 capsule (25 mg) by mouth 3 times daily as needed for anxiety 60 capsule 0    ketoconazole (NIZORAL) 2 % external cream Apply topically daily 30 g 1    levocetirizine (XYZAL) 5 MG tablet Take 1 tablet (5 mg) by mouth every evening 90 tablet 3    lisdexamfetamine (VYVANSE) 30 MG capsule Take 1 capsule (30 mg) by mouth every morning. 30 capsule 0    Omega-3 Fatty Acids (FISH OIL) 1200 MG capsule Take 1,200 mg by mouth daily      triamcinolone (KENALOG) 0.1 % external cream Apply topically 2 times daily as needed for irritation To rash on chest and arms 30 g 1     No current facility-administered medications for this visit.     HPI  Pleasant 33 year old male presents today as a(n) new patient for left ear pulsatile tinnitus that onset 10 months ago with TMJ dysfunction. He describes his pulsatile tinnitus as being synchronized with his heartbeat and occurring daily. He  states that his pulsatile tinnitus has fluctuated in intensity. He states that when his TMJ issues were bad, his left ear was popping often. He reports that his TMJ dysfunction is now under control, and that his pulsatile tinnitus gets worse with allergy flares.   He reports year round allergies.  He reports constant aural fullness/pressure.  He states that running would cause an off-balance sensation/ dizziness, however he denies feeling a spinning sensation.    He denies blurry vision, double vision, hx of trauma, hx of surgeries.     Review of Systems   Constitutional: Negative.    HENT:  Positive for tinnitus.    Eyes: Negative.  Negative for blurred vision and double vision.   Respiratory: Negative.     Gastrointestinal: Negative.    Skin: Negative.    Neurological:  Positive for dizziness.   Endo/Heme/Allergies:  Positive for environmental allergies.       Physical Exam  Vitals and nursing note reviewed.   Constitutional:       Appearance: Normal appearance.   HENT:      Head: Normocephalic and atraumatic.      Jaw: There is normal jaw occlusion.      Right Ear: Hearing, tympanic membrane and ear canal normal. No middle ear effusion.      Left Ear: Hearing, tympanic membrane and ear canal normal.  No middle ear effusion.      Nose: No mucosal edema, congestion or rhinorrhea.      Right Nostril: No occlusion.      Left Nostril: No occlusion.      Right Turbinates: Not enlarged or swollen.      Left Turbinates: Not enlarged or swollen.      Right Sinus: No maxillary sinus tenderness or frontal sinus tenderness.      Left Sinus: No maxillary sinus tenderness or frontal sinus tenderness.      Mouth/Throat:      Mouth: Mucous membranes are moist.      Pharynx: Oropharynx is clear. Uvula midline.   Eyes:      Extraocular Movements: Extraocular movements intact.      Pupils: Pupils are equal, round, and reactive to light.   Neurological:      Mental Status: He is alert.       AUDIOGRAM: The patient underwent an  audiogram today.  Results: Hearing WNL right. Mild low freq. SNHL rising to WNL left. 100% word rec. bilaterally. Tymps WNL. Present 1 kHz ipsi/ contra reflexes bilaterally.  Right: Speech reception threshold is 15 dB with 100% word recognition.   Left: Speech reception threshold is 15 dB with 100% word recognition.    A/P  Audiogram was independently reviewed and discussed in detail with the patient. This pleasant patient has mild left SNHL and left ear pulsatile tinnitus synchronized with his heartbeat. Further investigation such as MRA or CTA is needed, as such the following are ordered today:  MR Brain w/o & w Contrast  MRA Brain ([place] [facility]) w Contrast  MRV Brain with Contrast    He will receive a phone call to review the results of this test and future potential treatment options. Questions and concerns were addressed.    Follow up in clinic prn.    Scribe/Staff:    Scribe Disclosure:   I, Alena Francis, am serving as a scribe; to document services personally performed by Laura Haines MD based on data collection and the provider's statements to me.     Provider Disclosure:  I agree with above History, Review of Systems, Physical exam and Plan.  I have reviewed the content of the documentation and have edited it as needed. I have personally performed the services documented here and the documentation accurately represents those services and the decisions I have made.      Electronically signed by:  Laura Haines MD

## 2024-10-04 NOTE — LETTER
10/4/2024      Bib Cates  [address]  [facility] [address]      Dear Colleague,    Thank you for referring your patient, Bib Cates, to the [facility]. Please see a copy of my visit note below.    Chief Complaint   Patient presents with     pulsatile tinnitus     X 10 months, prev TMJ now under control , worse when allergies flare      PCP: Ian Crane     Referring Provider: Referred Self    There were no vitals taken for this visit.    ENT Problem List:  Patient Active Problem List   Diagnosis     Adjustment disorder with mixed anxiety and depressed mood     Cocaine abuse, in remission (H)     Moderate major depression (H)     Alcohol abuse, episodic drinking behavior     BRITTANY (generalized anxiety disorder)     Major depression, recurrent (H)     Moderate episode of recurrent major depressive disorder (H)      Current Medications:  Current Outpatient Medications   Medication Sig Dispense Refill     buPROPion (WELLBUTRIN XL) 300 MG 24 hr tablet Take 1 tablet (300 mg) by mouth every morning 90 tablet 1     cholecalciferol (VITAMIN D3) 125 mcg (5000 units) capsule Take 1 capsule (125 mcg) by mouth daily 90 capsule 3     desonide (DESOWEN) 0.05 % external cream Apply topically 2 times daily 15 g 1     hydrOXYzine ponce (VISTARIL) 25 MG capsule Take 1 capsule (25 mg) by mouth 3 times daily as needed for anxiety 60 capsule 0     ketoconazole (NIZORAL) 2 % external cream Apply topically daily 30 g 1     levocetirizine (XYZAL) 5 MG tablet Take 1 tablet (5 mg) by mouth every evening 90 tablet 3     lisdexamfetamine (VYVANSE) 30 MG capsule Take 1 capsule (30 mg) by mouth every morning. 30 capsule 0     Omega-3 Fatty Acids (FISH OIL) 1200 MG capsule Take 1,200 mg by mouth daily       triamcinolone (KENALOG) 0.1 % external cream Apply topically 2 times daily as needed for irritation To rash on chest and arms 30 g 1     No current facility-administered medications for this  visit.     HPI  Pleasant 33 year old male presents today as a(n) new patient for left ear pulsatile tinnitus that onset 10 months ago with TMJ dysfunction. He describes his pulsatile tinnitus as being synchronized with his heartbeat and occurring daily. He states that his pulsatile tinnitus has fluctuated in intensity. He states that when his TMJ issues were bad, his left ear was popping often. He reports that his TMJ dysfunction is now under control, and that his pulsatile tinnitus gets worse with allergy flares.   He reports year round allergies.  He reports constant aural fullness/pressure.  He states that running would cause an off-balance sensation/ dizziness, however he denies feeling a spinning sensation.    He denies blurry vision, double vision, hx of trauma, hx of surgeries.     Review of Systems   Constitutional: Negative.    HENT:  Positive for tinnitus.    Eyes: Negative.  Negative for blurred vision and double vision.   Respiratory: Negative.     Gastrointestinal: Negative.    Skin: Negative.    Neurological:  Positive for dizziness.   Endo/Heme/Allergies:  Positive for environmental allergies.       Physical Exam  Vitals and nursing note reviewed.   Constitutional:       Appearance: Normal appearance.   HENT:      Head: Normocephalic and atraumatic.      Jaw: There is normal jaw occlusion.      Right Ear: Hearing, tympanic membrane and ear canal normal. No middle ear effusion.      Left Ear: Hearing, tympanic membrane and ear canal normal.  No middle ear effusion.      Nose: No mucosal edema, congestion or rhinorrhea.      Right Nostril: No occlusion.      Left Nostril: No occlusion.      Right Turbinates: Not enlarged or swollen.      Left Turbinates: Not enlarged or swollen.      Right Sinus: No maxillary sinus tenderness or frontal sinus tenderness.      Left Sinus: No maxillary sinus tenderness or frontal sinus tenderness.      Mouth/Throat:      Mouth: Mucous membranes are moist.      Pharynx:  Oropharynx is clear. Uvula midline.   Eyes:      Extraocular Movements: Extraocular movements intact.      Pupils: Pupils are equal, round, and reactive to light.   Neurological:      Mental Status: He is alert.       AUDIOGRAM: The patient underwent an audiogram today.  Results: Hearing WNL right. Mild low freq. SNHL rising to WNL left. 100% word rec. bilaterally. Tymps WNL. Present 1 kHz ipsi/ contra reflexes bilaterally.  Right: Speech reception threshold is 15 dB with 100% word recognition.   Left: Speech reception threshold is 15 dB with 100% word recognition.    A/P  Audiogram was independently reviewed and discussed in detail with the patient. This pleasant patient has mild left SNHL and left ear pulsatile tinnitus synchronized with his heartbeat. Further investigation such as MRA or CTA is needed, as such the following are ordered today:  MR Brain w/o & w Contrast  MRA Brain ([place] [facility]) w Contrast  MRV Brain with Contrast    He will receive a phone call to review the results of this test and future potential treatment options. Questions and concerns were addressed.    Follow up in clinic prn.    Scribe/Staff:    Scribe Disclosure:   I, Alena Fracnis, am serving as a scribe; to document services personally performed by Laura Haines MD based on data collection and the provider's statements to me.     Provider Disclosure:  I agree with above History, Review of Systems, Physical exam and Plan.  I have reviewed the content of the documentation and have edited it as needed. I have personally performed the services documented here and the documentation accurately represents those services and the decisions I have made.      Electronically signed by:  Laura Haines MD         Again, thank you for allowing me to participate in the care of your patient.        Sincerely,        Laura Haines MD

## 2024-10-10 ENCOUNTER — MYC MEDICAL ADVICE (OUTPATIENT)
Dept: FAMILY MEDICINE | Facility: CLINIC | Age: 34
End: 2024-10-10
Payer: COMMERCIAL

## 2024-10-10 DIAGNOSIS — F90.2 ADHD (ATTENTION DEFICIT HYPERACTIVITY DISORDER), COMBINED TYPE: ICD-10-CM

## 2024-10-13 RX ORDER — LISDEXAMFETAMINE DIMESYLATE 40 MG/1
40 CAPSULE ORAL EVERY MORNING
Qty: 30 CAPSULE | Refills: 0 | Status: SHIPPED | OUTPATIENT
Start: 2024-10-13 | End: 2024-11-11

## 2024-10-30 DIAGNOSIS — F43.23 ADJUSTMENT DISORDER WITH MIXED ANXIETY AND DEPRESSED MOOD: ICD-10-CM

## 2024-10-30 RX ORDER — BUPROPION HYDROCHLORIDE 300 MG/1
300 TABLET ORAL EVERY MORNING
Qty: 90 TABLET | Refills: 1 | Status: SHIPPED | OUTPATIENT
Start: 2024-10-30

## 2024-11-11 ENCOUNTER — MYC REFILL (OUTPATIENT)
Dept: FAMILY MEDICINE | Facility: CLINIC | Age: 34
End: 2024-11-11
Payer: COMMERCIAL

## 2024-11-11 DIAGNOSIS — F90.2 ADHD (ATTENTION DEFICIT HYPERACTIVITY DISORDER), COMBINED TYPE: ICD-10-CM

## 2024-11-12 RX ORDER — LISDEXAMFETAMINE DIMESYLATE 40 MG/1
40 CAPSULE ORAL EVERY MORNING
Qty: 30 CAPSULE | Refills: 0 | Status: SHIPPED | OUTPATIENT
Start: 2024-11-12

## 2024-12-17 ENCOUNTER — MYC REFILL (OUTPATIENT)
Dept: FAMILY MEDICINE | Facility: CLINIC | Age: 34
End: 2024-12-17
Payer: COMMERCIAL

## 2024-12-17 DIAGNOSIS — F90.2 ADHD (ATTENTION DEFICIT HYPERACTIVITY DISORDER), COMBINED TYPE: ICD-10-CM

## 2024-12-17 RX ORDER — LISDEXAMFETAMINE DIMESYLATE 40 MG/1
40 CAPSULE ORAL EVERY MORNING
Qty: 30 CAPSULE | Refills: 0 | Status: SHIPPED | OUTPATIENT
Start: 2024-12-17

## 2025-02-18 ENCOUNTER — MYC REFILL (OUTPATIENT)
Dept: FAMILY MEDICINE | Facility: CLINIC | Age: 35
End: 2025-02-18
Payer: COMMERCIAL

## 2025-02-18 DIAGNOSIS — F90.2 ADHD (ATTENTION DEFICIT HYPERACTIVITY DISORDER), COMBINED TYPE: ICD-10-CM

## 2025-02-19 ENCOUNTER — TELEPHONE (OUTPATIENT)
Dept: FAMILY MEDICINE | Facility: CLINIC | Age: 35
End: 2025-02-19
Payer: COMMERCIAL

## 2025-02-19 RX ORDER — LISDEXAMFETAMINE DIMESYLATE 40 MG/1
40 CAPSULE ORAL EVERY MORNING
Qty: 30 CAPSULE | Refills: 0 | Status: SHIPPED | OUTPATIENT
Start: 2025-02-19

## 2025-02-22 NOTE — TELEPHONE ENCOUNTER
PA Initiation    Medication: LISDEXAMFETAMINE DIMESYLATE 40 MG PO CAPS  Insurance Company: [employer] Non-Specialty PA's - Phone [phone] Fax [phone]  Pharmacy Filling the Rx: [id number] [place] [address] [address]  Filling Pharmacy Phone: [phone]  Filling Pharmacy Fax: [phone]  Start Date: 2/22/2025

## 2025-03-20 ENCOUNTER — ANCILLARY PROCEDURE (OUTPATIENT)
Dept: CT IMAGING | Facility: CLINIC | Age: 35
End: 2025-03-20
Attending: OTOLARYNGOLOGY
Payer: COMMERCIAL

## 2025-03-20 DIAGNOSIS — H83.8X2 SUPERIOR SEMICIRCULAR CANAL DEHISCENCE OF LEFT EAR: ICD-10-CM

## 2025-03-20 PROCEDURE — 70480 CT ORBIT/EAR/FOSSA W/O DYE: CPT | Mod: GC | Performed by: RADIOLOGY

## 2025-03-24 ENCOUNTER — MYC REFILL (OUTPATIENT)
Dept: FAMILY MEDICINE | Facility: CLINIC | Age: 35
End: 2025-03-24
Payer: COMMERCIAL

## 2025-03-24 DIAGNOSIS — F90.2 ADHD (ATTENTION DEFICIT HYPERACTIVITY DISORDER), COMBINED TYPE: ICD-10-CM

## 2025-03-24 RX ORDER — LISDEXAMFETAMINE DIMESYLATE 40 MG/1
40 CAPSULE ORAL EVERY MORNING
Qty: 30 CAPSULE | Refills: 0 | Status: SHIPPED | OUTPATIENT
Start: 2025-03-24

## 2025-04-17 ENCOUNTER — MYC REFILL (OUTPATIENT)
Dept: FAMILY MEDICINE | Facility: CLINIC | Age: 35
End: 2025-04-17
Payer: COMMERCIAL

## 2025-04-17 DIAGNOSIS — F90.2 ADHD (ATTENTION DEFICIT HYPERACTIVITY DISORDER), COMBINED TYPE: ICD-10-CM

## 2025-04-17 RX ORDER — LISDEXAMFETAMINE DIMESYLATE 40 MG/1
40 CAPSULE ORAL EVERY MORNING
Qty: 30 CAPSULE | Refills: 0 | Status: SHIPPED | OUTPATIENT
Start: 2025-04-22

## 2025-04-30 DIAGNOSIS — F43.23 ADJUSTMENT DISORDER WITH MIXED ANXIETY AND DEPRESSED MOOD: ICD-10-CM

## 2025-04-30 RX ORDER — BUPROPION HYDROCHLORIDE 300 MG/1
300 TABLET ORAL EVERY MORNING
Qty: 90 TABLET | Refills: 1 | Status: SHIPPED | OUTPATIENT
Start: 2025-04-30

## 2025-05-04 NOTE — PROGRESS NOTES
Chief Complaint   Patient presents with    Follow Up     Left pulsatile tinn, superior semicircular canal dehiscence. Had MRI brain 2/20/25, CT temporal 3/20/25      PCP: Ian Crane     Referring Provider: Referred Self    There were no vitals taken for this visit.    ENT Problem List:  Patient Active Problem List   Diagnosis    Adjustment disorder with mixed anxiety and depressed mood    Cocaine abuse, in remission (H)    Moderate major depression (H)    Alcohol abuse, episodic drinking behavior    BRITTANY (generalized anxiety disorder)    Major depression, recurrent (H)    Moderate episode of recurrent major depressive disorder (H)      Current Medications:  Current Outpatient Medications   Medication Sig Dispense Refill    buPROPion (WELLBUTRIN XL) 300 MG 24 hr tablet Take 1 tablet (300 mg) by mouth every morning. 90 tablet 1    cholecalciferol (VITAMIN D3) 125 mcg (5000 units) capsule Take 1 capsule (125 mcg) by mouth daily 90 capsule 3    desonide (DESOWEN) 0.05 % external cream Apply topically 2 times daily. 15 g 1    hydrOXYzine ponce (VISTARIL) 25 MG capsule Take 1 capsule (25 mg) by mouth 3 times daily as needed for anxiety 60 capsule 0    ketoconazole (NIZORAL) 2 % external cream Apply topically daily. 30 g 1    levocetirizine (XYZAL) 5 MG tablet Take 1 tablet (5 mg) by mouth every evening 90 tablet 3    lisdexamfetamine (VYVANSE) 40 MG capsule Take 1 capsule (40 mg) by mouth every morning. 30 capsule 0    Omega-3 Fatty Acids (FISH OIL) 1200 MG capsule Take 1,200 mg by mouth daily      triamcinolone (KENALOG) 0.1 % external cream Apply topically 2 times daily as needed for irritation. To rash on chest and arms 30 g 1     No current facility-administered medications for this visit.     Surgical History:  Past Surgical History:   Procedure Laterality Date    HC TOOTH EXTRACTION W/FORCEP  12/28/2010    [place] teeth    VASECTOMY  10/2023     CT TEMPORAL W/O CONTRAST 3/20/2025   HISTORY: Superior  semicircular canal dehiscence of left ear    COMPARISON: MRI 2/20/2025     TECHNIQUE: Using multidetector thin collimation helical acquisition technique, axial and coronal thin section CT images were reconstructed through both temporal bones. Additional reconstructions performed in the planes of Poschl and Stenver were also obtained. Images were reviewed in a bone window.     FINDINGS:   Right temporal bone: Clear mastoid air cells. Clear external auditory canal. Tympanic membrane is intact. Clear right middle ear cavity. Intact bony ossicles. Clear epitympanum. Sharp bony scutum. Intact internal auditory canal and labyrinthine structures. No lucency of the fissula antefenestrum. The vestibular aqueduct is not enlarged. Facial nerve course appears normal.       Left temporal bone: Clear mastoid air cells. Clear external auditory canal. Tympanic membrane is intact. Clear right middle ear cavity. Intact bony ossicles. Clear epitympanum. Sharp bony scutum. Intact internal auditory canal and labyrinthine structures. No lucency of the fissula antefenestrum. The vestibular aqueduct is not enlarged. Facial nerve course appears normal. There is a defect in the arcuate eminence of the superior semicircular canal (series 6 images )                                                                    IMPRESSION: Left superior semicircular canal dehiscence.      MRA BRAIN ([place]) W/O CONTRAST, MRV BRAIN  W/O and W CONTRAST, MR INTERNAL AUDITORY CANAL (IAC) W/O and W CONTRAST 2/20/2025   INDICATION: Pulsatile tinnitus of left ear  COMPARISON: None.  TECHNIQUE:   1) Multiplanar multisequence brain and internal auditory canal MRI without and with intravenous contrast.  2) 3D time-of-flight head MRA without intravenous contrast.  3) Routine MRV without and with intravenous contrast.     FINDINGS:     HEAD MRI:  IAC: Dedicated imaging of the internal auditory canals demonstrates normal cranial nerve VII and VIII  complexes bilaterally. No cerebellopontine angle or internal auditory canal mass. No pathologic cranial nerve or temporal bone contrast enhancement.   Inner ear structures demonstrate expected fluid signal intensity. Suspected left superior semicircular canal dehiscence. No middle ear or mastoid effusion.     INTRACRANIAL CONTENTS: No acute or subacute infarct. No mass, acute hemorrhage, or extra-axial fluid collections. Normal brain parenchymal signal. Normal ventricles and sulci. Normal position of the cerebellar tonsils.      SELLA: No abnormality accounting for technique.     OSSEOUS STRUCTURES/SOFT TISSUES: Normal marrow signal. The major intracranial vascular flow voids are maintained.      ORBITS: No abnormality accounting for technique.      SINUSES/MASTOIDS: Mild mucosal thickening scattered about the paranasal sinuses. No middle ear or mastoid effusion.      HEAD MRA:   ANTERIOR CIRCULATION: No stenosis/occlusion, aneurysm, or high flow vascular malformation. Standard [place] anatomy.     POSTERIOR CIRCULATION: No stenosis/occlusion, aneurysm, or high flow vascular malformation. Balanced vertebral arteries supply a normal basilar artery.      HEAD MRV:   DURAL SINUSES: No significant stenosis or occlusion. Dominant left and smaller right transverse and sigmoid dural sinuses.     INTERNAL CEREBRAL VEINS: No significant stenosis or occlusion.       MAJOR CORTICAL VENOUS BRANCHES: No significant stenosis or occlusion.                                                                    IMPRESSION:  HEAD MRI:   1.  No acute intracranial abnormality.  2.  Suspected left superior semicircular canal dehiscence. Otherwise, normal MRI of the internal auditory canals and labyrinthine structures. Temporal bone CT can be performed for further evaluation.     HEAD MRA:   1.  Normal MRA [place].     HEAD MRV:   1.  Normal head MRV.    HPI  Pleasant 34 year old male presents today as an established  patient for follow-up, having a history of left pulsatile tinnitus, superior semicircular canal dehiscence. He was previously seen on 10/04/2024. He says that his experience has been harmful for his daily life.he heartbeat in his left ear has been overly bothersome, it is distracting and isolating. 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 The pounding is constant and causes him to become dizzy and nauseous. He also gets blurry vision. He has no known history of acoustic trauma. He has a history of tonsil stones and snores at night.     Review of Systems   Constitutional: Negative.    HENT:  Positive for tinnitus.    Eyes:  Positive for blurred vision.   Respiratory: Negative.     Gastrointestinal:  Positive for nausea.   Musculoskeletal: Negative.    Skin: Negative.    Neurological:  Positive for dizziness.   Endo/Heme/Allergies:  Positive for environmental allergies.   Psychiatric/Behavioral:  The patient is nervous/anxious.    All other systems reviewed and are negative.      Physical Exam  Vitals and nursing note reviewed.   Constitutional:       General: He is awake.      Appearance: Normal appearance.   HENT:      Head: Normocephalic and atraumatic.      Right Ear: Tympanic membrane, ear canal and external ear normal. No middle ear effusion.      Left Ear: Tympanic membrane, ear canal and external ear normal.  No middle ear effusion.      Nose: Septal deviation and congestion present.      Mouth/Throat:      Mouth: Mucous membranes are moist.   Eyes:      Extraocular Movements: Extraocular movements intact.      Pupils: Pupils are equal, round, and reactive to light.   Neurological:      Mental Status: He is alert.   Psychiatric:         Behavior: Behavior is cooperative.       AUDIOGRAM 10/04/2024  Results: Hearing WNL right. Mild low freq. SNHL rising to WNL left. 100% word rec. bilaterally. Tymps WNL. Present 1 kHz ipsi/contra reflexes bilaterally.       Right: Speech  reception threshold is 15 dB with 100% word recognition.   Left: Speech reception threshold is 15 dB with 100% word recognition.     A/P  This pleasant patient has left superior semicircular canal dehiscence. The patient's prior records, audiogram, and imaging were independently reviewed and discussed with the patient. His audiogram shown normal hearing on the right side and a mild low frequency sensorineural hearing loss on the left. It has been explained that tinnitus is a reaction from the brain to compensate for hearing loss. His imaging shown left superior semicircular canal dehiscence. Physical examination a deviated nasal septum to the right. There were no signs of infection.     Options including observation, medical, and surgical ones were discussed, including middle fossa approach and transmastoid approach. We discussed the risks, benefits, postoperative restrictions, and recovery methods. Alternatives and considerations were also discussed. Pros, cons, alternatives and complications were discussed. It has been explained that surgery will not improve his hearing. He has decided to proceed with the surgery. He has been given a referral and direct number to schedule surgical consultation with my colleague at the [facility], Dr Anny Grady. He has been advised to use Flonase for congestion. We can further explore his options at his discretion. All of the patient's questions were answered to his satisfaction. He has been encouraged to reach out via [employer] if there are any new concerns. He is in agreement with this plan and will return as needed.           Follow up in clinic PRN.    Scribe/Staff:    Scribe Disclosure:   I, Julia French, am serving as a scribe; to document services personally performed by Laura Haines MD based on data collection and the provider's statements to me.     Provider Disclosure:  I agree with above History, Review of Systems, Physical exam and Plan.  I have  reviewed the content of the documentation and have edited it as needed. I have personally performed the services documented here and the documentation accurately represents those services and the decisions I have made.      Electronically signed by:  Laura Haines MD

## 2025-05-07 ENCOUNTER — OFFICE VISIT (OUTPATIENT)
Dept: OTOLARYNGOLOGY | Facility: CLINIC | Age: 35
End: 2025-05-07
Payer: COMMERCIAL

## 2025-05-07 DIAGNOSIS — H83.8X2 SUPERIOR SEMICIRCULAR CANAL DEHISCENCE OF LEFT EAR: ICD-10-CM

## 2025-05-07 DIAGNOSIS — H93.A2 PULSATILE TINNITUS OF LEFT EAR: Primary | ICD-10-CM

## 2025-05-07 DIAGNOSIS — J34.3 NASAL TURBINATE HYPERTROPHY: ICD-10-CM

## 2025-05-07 DIAGNOSIS — J34.2 DEVIATED NASAL SEPTUM: ICD-10-CM

## 2025-05-07 ASSESSMENT — ENCOUNTER SYMPTOMS
NAUSEA: 1
CONSTITUTIONAL NEGATIVE: 1
DIZZINESS: 1
BLURRED VISION: 1
MUSCULOSKELETAL NEGATIVE: 1
NERVOUS/ANXIOUS: 1
RESPIRATORY NEGATIVE: 1

## 2025-05-07 NOTE — LETTER
5/7/2025      Bib Cates  [address]  [facility] [address]      Dear Colleague,    Thank you for referring your patient, Bib Cates, to the [facility]. Please see a copy of my visit note below.    Chief Complaint   Patient presents with     Follow Up     Left pulsatile tinn, superior semicircular canal dehiscence. Had MRI brain 2/20/25, CT temporal 3/20/25      PCP: Ian Crane     Referring Provider: Referred Self    There were no vitals taken for this visit.    ENT Problem List:  Patient Active Problem List   Diagnosis     Adjustment disorder with mixed anxiety and depressed mood     Cocaine abuse, in remission (H)     Moderate major depression (H)     Alcohol abuse, episodic drinking behavior     BRITTANY (generalized anxiety disorder)     Major depression, recurrent (H)     Moderate episode of recurrent major depressive disorder (H)      Current Medications:  Current Outpatient Medications   Medication Sig Dispense Refill     buPROPion (WELLBUTRIN XL) 300 MG 24 hr tablet Take 1 tablet (300 mg) by mouth every morning. 90 tablet 1     cholecalciferol (VITAMIN D3) 125 mcg (5000 units) capsule Take 1 capsule (125 mcg) by mouth daily 90 capsule 3     desonide (DESOWEN) 0.05 % external cream Apply topically 2 times daily. 15 g 1     hydrOXYzine ponce (VISTARIL) 25 MG capsule Take 1 capsule (25 mg) by mouth 3 times daily as needed for anxiety 60 capsule 0     ketoconazole (NIZORAL) 2 % external cream Apply topically daily. 30 g 1     levocetirizine (XYZAL) 5 MG tablet Take 1 tablet (5 mg) by mouth every evening 90 tablet 3     lisdexamfetamine (VYVANSE) 40 MG capsule Take 1 capsule (40 mg) by mouth every morning. 30 capsule 0     Omega-3 Fatty Acids (FISH OIL) 1200 MG capsule Take 1,200 mg by mouth daily       triamcinolone (KENALOG) 0.1 % external cream Apply topically 2 times daily as needed for irritation. To rash on chest and arms 30 g 1     No current  facility-administered medications for this visit.     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 Additional reconstructions performed in the planes of Poschl and Stenver were also obtained. Images were reviewed in a bone window.     FINDINGS:   Right temporal bone: Clear mastoid air cells. Clear external auditory canal. Tympanic membrane is intact. Clear right middle ear cavity. Intact bony ossicles. Clear epitympanum. Sharp bony scutum. Intact internal auditory canal and labyrinthine structures. No lucency of the fissula antefenestrum. The vestibular aqueduct is not enlarged. Facial nerve course appears normal.       Left temporal bone: Clear mastoid air cells. Clear external auditory canal. Tympanic membrane is intact. Clear right middle ear cavity. Intact bony ossicles. Clear epitympanum. Sharp bony scutum. Intact internal auditory canal and labyrinthine structures. No lucency of the fissula antefenestrum. The vestibular aqueduct is not enlarged. Facial nerve course appears normal. There is a defect in the arcuate eminence of the superior semicircular canal (series 6 images )                                                                    IMPRESSION: Left superior semicircular canal dehiscence.      MRA BRAIN ([place] [facility] [place]) W/O CONTRAST, MRV BRAIN  W/O and W CONTRAST, MR INTERNAL AUDITORY CANAL (IAC) W/O and W CONTRAST 2/20/2025   INDICATION: Pulsatile tinnitus of left ear  COMPARISON: None.  TECHNIQUE:   1) Multiplanar multisequence brain and internal auditory canal MRI without and with  intravenous contrast.  2) 3D time-of-flight head MRA without intravenous contrast.  3) Routine MRV without and with intravenous contrast.     FINDINGS:     HEAD MRI:  IAC: Dedicated imaging of the internal auditory canals demonstrates normal cranial nerve VII and VIII complexes bilaterally. No cerebellopontine angle or internal auditory canal mass. No pathologic cranial nerve or temporal bone contrast enhancement.   Inner ear structures demonstrate expected fluid signal intensity. Suspected left superior semicircular canal dehiscence. No middle ear or mastoid effusion.     INTRACRANIAL CONTENTS: No acute or subacute infarct. No mass, acute hemorrhage, or extra-axial fluid collections. Normal brain parenchymal signal. Normal ventricles and sulci. Normal position of the cerebellar tonsils.      SELLA: No abnormality accounting for technique.     OSSEOUS STRUCTURES/SOFT TISSUES: Normal marrow signal. The major intracranial vascular flow voids are maintained.      ORBITS: No abnormality accounting for technique.      SINUSES/MASTOIDS: Mild mucosal thickening scattered about the paranasal sinuses. No middle ear or mastoid effusion.      HEAD MRA:   ANTERIOR CIRCULATION: No stenosis/occlusion, aneurysm, or high flow vascular malformation. Standard [place] anatomy.     POSTERIOR CIRCULATION: No stenosis/occlusion, aneurysm, or high flow vascular malformation. Balanced vertebral arteries supply a normal basilar artery.      HEAD MRV:   DURAL SINUSES: No significant stenosis or occlusion. Dominant left and smaller right transverse and sigmoid dural sinuses.     INTERNAL CEREBRAL VEINS: No significant stenosis or occlusion.       MAJOR CORTICAL VENOUS BRANCHES: No significant stenosis or occlusion.                                                                    IMPRESSION:  HEAD MRI:   1.  No acute intracranial abnormality.  2.  Suspected left superior semicircular canal dehiscence. Otherwise, normal MRI of the  internal auditory canals and labyrinthine structures. Temporal bone CT can be performed for further evaluation.     HEAD MRA:   1.  Normal MRA [place].     HEAD MRV:   1.  Normal head MRV.    HPI  Pleasant 34 year old male presents today as an established patient for follow-up, having a history of left pulsatile tinnitus, superior semicircular canal dehiscence. He was previously seen on 10/04/2024. He says that his experience has been harmful for his daily life.he heartbeat in his left ear has been overly bothersome, it is distracting and isolating. 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 The pounding is constant and causes him to become dizzy and nauseous. He also gets blurry vision. He has no known history of acoustic trauma. He has a history of tonsil stones and snores at night.     Review of Systems   Constitutional: Negative.    HENT:  Positive for tinnitus.    Eyes:  Positive for blurred vision.   Respiratory: Negative.     Gastrointestinal:  Positive for nausea.   Musculoskeletal: Negative.    Skin: Negative.    Neurological:  Positive for dizziness.   Endo/Heme/Allergies:  Positive for environmental allergies.   Psychiatric/Behavioral:  The patient is nervous/anxious.    All other systems reviewed and are negative.      Physical Exam  Vitals and nursing note reviewed.   Constitutional:       General: He is awake.      Appearance: Normal appearance.   HENT:      Head: Normocephalic and atraumatic.      Right Ear: Tympanic membrane, ear canal and external ear normal. No middle ear effusion.      Left Ear: Tympanic membrane, ear canal and external ear normal.  No middle ear effusion.      Nose: Septal deviation and congestion present.      Mouth/Throat:      Mouth: Mucous membranes are moist.   Eyes:      Extraocular Movements: Extraocular movements intact.      Pupils: Pupils are equal, round, and reactive to light.   Neurological:      Mental Status: He is alert.    Psychiatric:         Behavior: Behavior is cooperative.       AUDIOGRAM 10/04/2024  Results: Hearing WNL right. Mild low freq. SNHL rising to WNL left. 100% word rec. bilaterally. Tymps WNL. Present 1 kHz ipsi/contra reflexes bilaterally.       Right: Speech reception threshold is 15 dB with 100% word recognition.   Left: Speech reception threshold is 15 dB with 100% word recognition.     A/P  This pleasant patient has left superior semicircular canal dehiscence. The patient's prior records, audiogram, and imaging were independently reviewed and discussed with the patient. His audiogram shown normal hearing on the right side and a mild low frequency sensorineural hearing loss on the left. It has been explained that tinnitus is a reaction from the brain to compensate for hearing loss. His imaging shown left superior semicircular canal dehiscence. Physical examination a deviated nasal septum to the right. There were no signs of infection.     Options including observation, medical, and surgical ones were discussed, including middle fossa approach and transmastoid approach. We discussed the risks, benefits, postoperative restrictions, and recovery methods. Alternatives and considerations were also discussed. Pros, cons, alternatives and complications were discussed. It has been explained that surgery will not improve his hearing. He has decided to proceed with the surgery. He has been given a referral and direct number to schedule surgical consultation with my colleague at the [facility], Dr Anny Grady. He has been advised to use Flonase for congestion. We can further explore his options at his discretion. All of the patient's questions were answered to his satisfaction. He has been encouraged to reach out via [employer] if there are any new concerns. He is in agreement with this plan and will return as needed.           Follow up in clinic PRN.    Scribe/Staff:    Scribe Disclosure:   Julia BENEDICT,  am serving as a scribe; to document services personally performed by Laura Haines MD based on data collection and the provider's statements to me.     Provider Disclosure:  I agree with above History, Review of Systems, Physical exam and Plan.  I have reviewed the content of the documentation and have edited it as needed. I have personally performed the services documented here and the documentation accurately represents those services and the decisions I have made.      Electronically signed by:  Laura Haines MD         Again, thank you for allowing me to participate in the care of your patient.        Sincerely,        Laura Haines MD    Electronically signed

## 2025-05-07 NOTE — NURSING NOTE
Bib Cates's chief complaint for this visit includes:  Chief Complaint   Patient presents with    Follow Up     Left pulsatile tinn, superior semicircular canal dehiscence. Had MRI brain 2/20/25, CT temporal 3/20/25     PCP: Ian Crane    Referring Provider:  Referred Self, MD  No address on file    There were no vitals taken for this visit.

## 2025-05-15 ENCOUNTER — TELEPHONE (OUTPATIENT)
Dept: OTOLARYNGOLOGY | Facility: CLINIC | Age: 35
End: 2025-05-15
Payer: COMMERCIAL

## 2025-05-15 NOTE — TELEPHONE ENCOUNTER
[facility] Call Center    Phone Message    May a detailed message be left on voicemail: yes     Reason for Call: Other: Patient said Dr. Haines gave him a referral to see Dr. Grady in [place]. Has the order been put in yet or is this correct from the patient? Thank you      Action Taken: Message routed to:  Adult Clinics: ENT p [id number]    Travel Screening: Not Applicable     Date of Service:

## 2025-05-16 ENCOUNTER — TELEPHONE (OUTPATIENT)
Dept: OTOLARYNGOLOGY | Facility: CLINIC | Age: 35
End: 2025-05-16
Payer: COMMERCIAL

## 2025-05-16 NOTE — TELEPHONE ENCOUNTER
[facility] Call [facility]    Phone Message    May a detailed message be left on voicemail: yes     Reason for Call: Other: Please contact pt for scheduling - Neurotology referral (Miguel A) semicircular canal dehiscence.  [facility] location, thanks     Action Taken: Other: ENT    Travel Screening: Not Applicable     Date of Service:               I had a pleasure to talk to Jaziel over the phone. I answered all his questions. F/up as needed.

## 2025-05-19 ENCOUNTER — MYC REFILL (OUTPATIENT)
Dept: FAMILY MEDICINE | Facility: CLINIC | Age: 35
End: 2025-05-19
Payer: COMMERCIAL

## 2025-05-19 ENCOUNTER — PATIENT OUTREACH (OUTPATIENT)
Dept: CARE COORDINATION | Facility: CLINIC | Age: 35
End: 2025-05-19
Payer: COMMERCIAL

## 2025-05-19 DIAGNOSIS — F90.2 ADHD (ATTENTION DEFICIT HYPERACTIVITY DISORDER), COMBINED TYPE: ICD-10-CM

## 2025-05-19 RX ORDER — LISDEXAMFETAMINE DIMESYLATE 40 MG/1
40 CAPSULE ORAL EVERY MORNING
Qty: 30 CAPSULE | Refills: 0 | Status: SHIPPED | OUTPATIENT
Start: 2025-05-19

## 2025-06-12 NOTE — TELEPHONE ENCOUNTER
Prior Authorization Approval    Medication: LISDEXAMFETAMINE DIMESYLATE 40 MG PO CAPS  Authorization Effective Date: 2/1/2025  Authorization Expiration Date: 2/22/2026  Approved Dose/Quantity:   Reference #:     Insurance Company: [employer]ty [employer] - Phone [phone] Fax [phone]  Expected CoPay: $    CoPay Card Available:      Financial Assistance Needed:   Which Pharmacy is filling the prescription: [id number] - [place], [place] - [address]  Pharmacy Notified: YES  Patient Notified: **Instructed pharmacy to notify patient when script is ready to /ship.**         of a capsular stripping/periosteal sleeve injury that is nondisplaced.  There is no associated fracture.  There is no rotator cuff tear associated.      Assessment:   Encounter Diagnosis   Name Primary?    Closed dislocation of left shoulder, subsequent encounter Yes          Plan:  Patient has been in a sling for the last 3 to 4 weeks.  He will now begin to come out of the sling and begin rehab.  No surgical intervention is warranted at this time.  I will see him back in 6 weeks for reevaluation.  Due to the weakness in the extremity and inability to lift, he will be kept off of his job at this time and will return to work at the next visit if improving.  We will discuss restrictions at that visit as needed.    A prescription for physical therapy will be written today to improve motion, strength, and pain.  The patient understands that it is their responsibility to contact and a schedule their appointment.  Therapy will be 2-3 times per week for the next 4-6 weeks.    Return in about 6 weeks (around 7/25/2025) for follow up L shoulder, no xray.     Electronically signed by ESTEVAN Shaffer on 6/13/2025 at 3:46 PM.

## 2025-06-15 ENCOUNTER — MYC REFILL (OUTPATIENT)
Dept: FAMILY MEDICINE | Facility: CLINIC | Age: 35
End: 2025-06-15
Payer: COMMERCIAL

## 2025-06-15 DIAGNOSIS — F90.2 ADHD (ATTENTION DEFICIT HYPERACTIVITY DISORDER), COMBINED TYPE: ICD-10-CM

## 2025-06-15 RX ORDER — LISDEXAMFETAMINE DIMESYLATE 40 MG/1
40 CAPSULE ORAL EVERY MORNING
Qty: 30 CAPSULE | Refills: 0 | Status: SHIPPED | OUTPATIENT
Start: 2025-06-15

## 2025-07-28 SDOH — HEALTH STABILITY: PHYSICAL HEALTH: ON AVERAGE, HOW MANY MINUTES DO YOU ENGAGE IN EXERCISE AT THIS LEVEL?: 90 MIN

## 2025-07-28 SDOH — HEALTH STABILITY: PHYSICAL HEALTH: ON AVERAGE, HOW MANY DAYS PER WEEK DO YOU ENGAGE IN MODERATE TO STRENUOUS EXERCISE (LIKE A BRISK WALK)?: 4 DAYS

## 2025-07-28 ASSESSMENT — SOCIAL DETERMINANTS OF HEALTH (SDOH): HOW OFTEN DO YOU GET TOGETHER WITH FRIENDS OR RELATIVES?: ONCE A WEEK

## 2025-07-31 ENCOUNTER — RESULTS FOLLOW-UP (OUTPATIENT)
Dept: FAMILY MEDICINE | Facility: CLINIC | Age: 35
End: 2025-07-31

## 2025-07-31 ENCOUNTER — OFFICE VISIT (OUTPATIENT)
Dept: FAMILY MEDICINE | Facility: CLINIC | Age: 35
End: 2025-07-31
Attending: FAMILY MEDICINE
Payer: COMMERCIAL

## 2025-07-31 VITALS
HEART RATE: 89 BPM | OXYGEN SATURATION: 98 % | DIASTOLIC BLOOD PRESSURE: 85 MMHG | HEIGHT: 69 IN | TEMPERATURE: 98.1 F | WEIGHT: 199 LBS | RESPIRATION RATE: 17 BRPM | BODY MASS INDEX: 29.47 KG/M2 | SYSTOLIC BLOOD PRESSURE: 125 MMHG

## 2025-07-31 DIAGNOSIS — Z00.00 ROUTINE GENERAL MEDICAL EXAMINATION AT A HEALTH CARE FACILITY: Primary | ICD-10-CM

## 2025-07-31 DIAGNOSIS — F19.11 HX OF SUBSTANCE ABUSE (H): ICD-10-CM

## 2025-07-31 DIAGNOSIS — Z13.6 SCREENING, HEART DISEASE, ISCHEMIC: ICD-10-CM

## 2025-07-31 DIAGNOSIS — L30.9 ECZEMA, UNSPECIFIED TYPE: ICD-10-CM

## 2025-07-31 DIAGNOSIS — F33.1 MODERATE EPISODE OF RECURRENT MAJOR DEPRESSIVE DISORDER (H): ICD-10-CM

## 2025-07-31 DIAGNOSIS — F14.11 COCAINE ABUSE, IN REMISSION (H): ICD-10-CM

## 2025-07-31 DIAGNOSIS — Z13.1 SCREENING FOR DIABETES MELLITUS: ICD-10-CM

## 2025-07-31 DIAGNOSIS — F90.2 ADHD (ATTENTION DEFICIT HYPERACTIVITY DISORDER), COMBINED TYPE: ICD-10-CM

## 2025-07-31 DIAGNOSIS — Z11.59 NEED FOR HEPATITIS C SCREENING TEST: ICD-10-CM

## 2025-07-31 LAB
BASOPHILS # BLD AUTO: 0 10E3/UL (ref 0–0.2)
BASOPHILS NFR BLD AUTO: 1 %
CHOLEST SERPL-MCNC: 253 MG/DL
EOSINOPHIL # BLD AUTO: 0.3 10E3/UL (ref 0–0.7)
EOSINOPHIL NFR BLD AUTO: 7 %
ERYTHROCYTE [DISTWIDTH] IN BLOOD BY AUTOMATED COUNT: 11.2 % (ref 10–15)
EST. AVERAGE GLUCOSE BLD GHB EST-MCNC: 94 MG/DL
FASTING STATUS PATIENT QL REPORTED: NO
FASTING STATUS PATIENT QL REPORTED: NO
GLUCOSE SERPL-MCNC: 96 MG/DL (ref 70–99)
HBA1C MFR BLD: 4.9 % (ref 0–5.6)
HCT VFR BLD AUTO: 44.6 % (ref 40–53)
HCV AB SERPL QL IA: NONREACTIVE
HDLC SERPL-MCNC: 51 MG/DL
HGB BLD-MCNC: 15.2 G/DL (ref 13.3–17.7)
IMM GRANULOCYTES # BLD: 0 10E3/UL
IMM GRANULOCYTES NFR BLD: 0 %
LDLC SERPL CALC-MCNC: 171 MG/DL
LYMPHOCYTES # BLD AUTO: 1.6 10E3/UL (ref 0.8–5.3)
LYMPHOCYTES NFR BLD AUTO: 37 %
MCH RBC QN AUTO: 29.7 PG (ref 26.5–33)
MCHC RBC AUTO-ENTMCNC: 34.1 G/DL (ref 31.5–36.5)
MCV RBC AUTO: 87 FL (ref 78–100)
MONOCYTES # BLD AUTO: 0.5 10E3/UL (ref 0–1.3)
MONOCYTES NFR BLD AUTO: 11 %
NEUTROPHILS # BLD AUTO: 1.9 10E3/UL (ref 1.6–8.3)
NEUTROPHILS NFR BLD AUTO: 43 %
NONHDLC SERPL-MCNC: 202 MG/DL
PLATELET # BLD AUTO: 203 10E3/UL (ref 150–450)
RBC # BLD AUTO: 5.12 10E6/UL (ref 4.4–5.9)
TRIGL SERPL-MCNC: 154 MG/DL
WBC # BLD AUTO: 4.4 10E3/UL (ref 4–11)

## 2025-07-31 RX ORDER — LISDEXAMFETAMINE DIMESYLATE 40 MG/1
40 CAPSULE ORAL DAILY
Qty: 30 CAPSULE | Refills: 0 | Status: SHIPPED | OUTPATIENT
Start: 2025-10-10 | End: 2025-11-09

## 2025-07-31 RX ORDER — LISDEXAMFETAMINE DIMESYLATE 40 MG/1
40 CAPSULE ORAL DAILY
Qty: 30 CAPSULE | Refills: 0 | Status: SHIPPED | OUTPATIENT
Start: 2025-08-15 | End: 2025-09-14

## 2025-07-31 RX ORDER — TRIAMCINOLONE ACETONIDE 1 MG/G
CREAM TOPICAL 2 TIMES DAILY PRN
Qty: 30 G | Refills: 1 | Status: SHIPPED | OUTPATIENT
Start: 2025-07-31

## 2025-07-31 RX ORDER — LISDEXAMFETAMINE DIMESYLATE 40 MG/1
40 CAPSULE ORAL DAILY
Qty: 30 CAPSULE | Refills: 0 | Status: SHIPPED | OUTPATIENT
Start: 2025-09-12 | End: 2025-10-12

## 2025-07-31 ASSESSMENT — PAIN SCALES - GENERAL: PAINLEVEL_OUTOF10: NO PAIN (0)

## 2025-07-31 NOTE — PROGRESS NOTES
Preventive Care Visit  [facility]  Ian Crane MD, Family Medicine  Jul 31, 2025      Assessment & Plan     Routine general medical examination at a health care facility  Routine health issues appropriate for age reviewed.  Follow-up is recommended in 1 year.    Eczema, unspecified type  Triamcinolone cream was refilled at his request.  - triamcinolone (KENALOG) 0.1 % external cream; Apply topically 2 times daily as needed for irritation. To rash on chest and arms    Need for hepatitis C screening test  One-time screening test for hepatitis C was discussed and completed today.  - Hepatitis C Screen Reflex to HCV RNA Quant and Genotype; Future  - Hepatitis C Screen Reflex to HCV RNA Quant and Genotype    ADHD (attention deficit hyperactivity disorder), combined type  Vyvanse is working well for him.  3 consecutive prescriptions starting mid next month were sent to the pharmacy and he should reach out to the pharmacy for refills until these are used.  PDMP was reviewed and no concerns.  - lisdexamfetamine (VYVANSE) 40 MG capsule; Take 1 capsule (40 mg) by mouth daily.  - lisdexamfetamine (VYVANSE) 40 MG capsule; Take 1 capsule (40 mg) by mouth daily.  - lisdexamfetamine (VYVANSE) 40 MG capsule; Take 1 capsule (40 mg) by mouth daily.    Screening, heart disease, ischemic  Screening for heart disease was discussed and done today.  - Lipid panel reflex to direct LDL Fasting; Future  - CBC with platelets and differential; Future  - Lipid panel reflex to direct LDL Fasting  - CBC with platelets and differential    Screening for diabetes mellitus  Screening for diabetes was discussed and done today.  - Glucose; Future  - Hemoglobin A1c; Future  - Glucose  - Hemoglobin A1c    Cocaine abuse, in remission (H)  Patient confirms remission and no cocaine use at this time.    Hx of substance abuse (H)  Minimal to no substance use at this time we will continue to  "monitor.    Moderate episode of recurrent major depressive disorder (H)  Patient feels mood is stable at this time on current medications.      The longitudinal plan of care for the diagnosis(es)/condition(s) as documented were addressed during this visit. Due to the added complexity in care, I will continue to support Jaziel in the subsequent management and with ongoing continuity of care.      Patient has been advised of split billing requirements and indicates understanding: Yes    BMI  Estimated body mass index is 29.81 kg/m  as calculated from the following:    Height as of this encounter: 1.74 m (5' 8.5\").    Weight as of this encounter: 90.3 kg (199 lb).   Weight management plan: Discussed healthy diet and exercise guidelines    Counseling  Appropriate preventive services were addressed with this patient via screening, questionnaire, or discussion as appropriate for fall prevention, nutrition, physical activity, Tobacco-use cessation, social engagement, weight loss and cognition.  Checklist reviewing preventive services available has been given to the patient.  Reviewed patient's diet, addressing concerns and/or questions.   The patient reports drinking more than 3 alcoholic drinks per day and/or more than 7 drhnks per week. The patient was counseled and given information about possible harmful effects of excessive alcohol intake.Reviewed preventive health counseling, as reflected in patient instructions    Follow-up    Follow-up Visit   Expected date:  Jan 31, 2026 (Approximate)      Follow Up Appointment Details:     Follow-up with whom?: Me    Follow-Up for what?: Chronic Disease f/u    Chronic Disease f/u: General (Other)    Additional Details: ADHD    How?: In Person or Virtual    Is this an as-needed follow-up?: No             Follow-up Visit   Expected date:  Jul 31, 2026 (Approximate)      Follow Up Appointment Details:     Follow-up with whom?: PCP    Follow-Up for what?: Adult Preventive    How?: In " Boyd Sheriff is a 34 year old, presenting for the following:  Physical        7/31/2025     9:55 AM   Additional Questions   Roomed by Lilia CODNE       Patient is here today for routine physical.  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  It was recommended by ENT that he see a neuro otologist for surgical consultation.  It was felt that surgery would not be helpful.  He reports to me symptoms of dizziness and vertigo with exercise as well as a general lack of balance and a constant annoying sensation of hearing his heartbeat in his ear which he finds very distracting.  It increases with stress and leads to headaches.  His primary concern is some of the dizziness and lack of balance which have impacted his quality of life and ability to exercise.        Advance Care Planning    Discussed advance care planning with patient; informed AVS has link to [employer].        7/28/2025   General Health   How would you rate your overall physical health? Excellent   Feel stress (tense, anxious, or unable to sleep) Only a little   (!) STRESS CONCERN      7/28/2025   Nutrition   Three or more servings of calcium each day? Yes   Diet: Regular (no restrictions)   How many servings of fruit and vegetables per day? (!) 2-3   How many sweetened beverages each day? 0-1         7/28/2025   Exercise   Days per week of moderate/strenous exercise 4 days   Average minutes spent exercising at this level 90 min         7/28/2025   Social Factors   Frequency of gathering with friends or relatives Once a week   Worry food won't last until get money to buy more No   Food not last or not have enough money for food? No   Do you have housing? (Housing is defined as stable permanent housing and does not include staying outside in a car, in a tent, in an abandoned building, in an  overnight shelter, or couch-surfing.) Yes   Are you worried about losing your housing? No   Lack of transportation? No   Unable to get utilities (heat,electricity)? No         7/28/2025   Dental   Dentist two times every year? Yes         Today's PHQ-9 Score:       4/30/2025     9:23 AM   PHQ-9 SCORE   PHQ-9 Total Score MyChart 5 (Mild depression)   PHQ-9 Total Score 5        Patient-reported           7/28/2025   Substance Use   Alcohol more than 3/day or more than 7/wk Yes   How often do you have a drink containing alcohol 4 or more times a week   How many alcohol drinks on typical day 1 or 2   How often do you have 5+ drinks at one occasion Less than monthly   Audit 2/3 Score 1   How often not able to stop drinking once started Never   How often failed to do what normally expected Never   How often needed first drink in am after a heavy drinking session Never   How often feeling of guilt or remorse after drinking Less than monthly   How often unable to remember what happened the night before Never   Have you or someone else been injured because of your drinking No   Has anyone been concerned or suggested you cut down on drinking No   TOTAL SCORE - AUDIT 6   Do you use any other substances recreationally? (!) OTHER     Social History     Tobacco Use    Smoking status: Never    Smokeless tobacco: Never   Vaping Use    Vaping status: Never Used   Substance Use Topics    Alcohol use: Yes     Alcohol/week: 14.0 standard drinks of alcohol     Types: 14 Cans of beer per week    Drug use: Not Currently     Types: Cocaine, MDMA (Ecstasy), LSD     Comment: occasional use           7/28/2025   STI Screening   New sexual partner(s) since last STI/HIV test? No        Reviewed and updated as needed this visit by Provider   Tobacco  Allergies  Meds  Problems  Med Hx  Surg Hx  Fam Hx  Soc   Hx Sexual Activity          Patient Active Problem List   Diagnosis    Adjustment disorder with mixed anxiety and depressed mood     Cocaine abuse, in remission (H)    Moderate major depression (H)    Alcohol abuse, episodic drinking behavior    BRITTANY (generalized anxiety disorder)    Major depression, recurrent (H)     Past Surgical History:   Procedure Laterality Date    HC TOOTH EXTRACTION W/FORCEP  12/28/2010    [place] teeth    VASECTOMY  10/2023       Social History     Tobacco Use    Smoking status: Never    Smokeless tobacco: Never   Substance Use Topics    Alcohol use: Yes     Alcohol/week: 14.0 standard drinks of alcohol     Types: 14 Cans of beer per week     Family History   Problem Relation Age of Onset    Substance Abuse Mother     Anxiety Disorder Mother     Depression Mother     Mental Illness Mother     Other - See Comments Mother         unknown cause of death, age 56    Alcoholism Mother     Diabetes Type 2  Mother     Depression Father     Anxiety Disorder Father     Hypertension Father     Depression Sister     Anxiety Disorder Sister     Depression Sister     Anxiety Disorder Sister     Depression Brother     Anxiety Disorder Brother          Current Outpatient Medications   Medication Sig Dispense Refill    buPROPion (WELLBUTRIN XL) 300 MG 24 hr tablet Take 1 tablet (300 mg) by mouth every morning. 90 tablet 1    cholecalciferol (VITAMIN D3) 125 mcg (5000 units) capsule Take 1 capsule (125 mcg) by mouth daily 90 capsule 3    hydrOXYzine ponce (VISTARIL) 25 MG capsule Take 1 capsule (25 mg) by mouth 3 times daily as needed for anxiety 60 capsule 0    levocetirizine (XYZAL) 5 MG tablet Take 1 tablet (5 mg) by mouth every evening 90 tablet 3    [START ON 8/15/2025] lisdexamfetamine (VYVANSE) 40 MG capsule Take 1 capsule (40 mg) by mouth daily. 30 capsule 0    [START ON 9/12/2025] lisdexamfetamine (VYVANSE) 40 MG capsule Take 1 capsule (40 mg) by mouth daily. 30 capsule 0    [START ON 10/10/2025] lisdexamfetamine (VYVANSE) 40 MG capsule Take 1 capsule (40 mg) by mouth daily. 30 capsule 0    lisdexamfetamine (VYVANSE) 40 MG capsule  "Take 1 capsule (40 mg) by mouth every morning. 30 capsule 0    Omega-3 Fatty Acids (FISH OIL) 1200 MG capsule Take 1,200 mg by mouth daily      triamcinolone (KENALOG) 0.1 % external cream Apply topically 2 times daily as needed for irritation. To rash on chest and arms 30 g 1    ketoconazole (NIZORAL) 2 % external cream Apply topically daily. (Patient not taking: Reported on 7/31/2025) 30 g 1     Allergies   Allergen Reactions    Cats     Dust Mites     Grass     Mugwort [Mugwort (Artemisia Vulgaris)]     Ragweeds     Trees          Review of Systems  Constitutional, HEENT, cardiovascular, pulmonary, GI, , musculoskeletal, neuro, skin, endocrine and psych systems are negative, except as otherwise noted.     Objective    Exam  /85   Pulse 89   Temp 98.1  F (36.7  C) (Temporal)   Resp 17   Ht 1.74 m (5' 8.5\")   Wt 90.3 kg (199 lb)   SpO2 98%   BMI 29.81 kg/m     Estimated body mass index is 29.81 kg/m  as calculated from the following:    Height as of this encounter: 1.74 m (5' 8.5\").    Weight as of this encounter: 90.3 kg (199 lb).    Physical Exam  GENERAL: alert and no distress  EYES: Eyes grossly normal to inspection, PERRL and conjunctivae and sclerae normal  HENT: ear canals and TM's normal, nose and mouth without ulcers or lesions  NECK: no adenopathy, no asymmetry, masses, or scars  RESP: lungs clear to auscultation - no rales, rhonchi or wheezes  CV: regular rate and rhythm, normal S1 S2, no S3 or S4, no murmur, click or rub, no peripheral edema  ABDOMEN: soft, nontender, no hepatosplenomegaly, no masses and bowel sounds normal  MS: no gross musculoskeletal defects noted, no edema  SKIN: no suspicious lesions or rashes  NEURO: Normal strength and tone, mentation intact and speech normal  PSYCH: mentation appears normal, affect normal/bright        Signed Electronically by: Ian Crane MD    "

## 2025-07-31 NOTE — Clinical Note
I know there was a communication regarding this patient's case already.  Today he reports to me significant disability with exercise specifically related to dizziness and difficulty with balance which was not reported previously so I am wondering if reconsideration of a visit with neurotology or possible surgery would be helpful?  Thanks Ian Crane MD

## 2025-07-31 NOTE — PATIENT INSTRUCTIONS
"Patient Education   Preventive Care Advice   This is general advice we often give to help people stay healthy. Your care team may have specific advice just for you. Please talk to your care team about your own preventive care needs.  Lifestyle  Exercise at least 150 minutes each week (30 minutes a day, 5 days a week).  Do muscle strengthening activities 2 days a week. These help control your weight and prevent disease.  No smoking.  Wear sunscreen to prevent skin cancer.  Take time with family and friends.  Have your home tested for radon every 2 to 5 years. Radon is a colorless, odorless gas that can harm your lungs. To learn more, go to www.health.[facility].[place].us and search for \"Radon in Homes.\"  Keep guns unloaded and locked up in a safe place like a safe or gun vault, or, use a gun lock and hide the keys. Always lock away bullets separately. To learn more, visit [employer].gov and search for \"safe gun storage.\"  Nutrition  Eat 5 or more servings of fruits and vegetables each day.  Try wheat bread, brown rice and whole grain pasta (instead of white bread, rice, and pasta).  Get enough calcium and vitamin D. Check the label on foods and aim for 100% of the RDA (recommended daily allowance).  Regular exams  Have a dental exam and cleaning every 6 months.  Older adults: Ask your care team how often to have memory testing.  See your health care team every year to talk about:  Any changes in your health.  Any medicines your care team has prescribed.  Preventive care, family planning, and ways to prevent chronic diseases.  Shots (vaccines)   HPV shots (up to age 26), if you've never had them before.  Hepatitis B shots (up to age 59), if you've never had them before.  COVID-19 shot: Get this shot when it's due.  Flu shot: Get a flu shot every year.  Tetanus shot: Get a tetanus shot every 10 years.  Pneumococcal, hepatitis A, and RSV shots: Ask your care team if you need these based on your risk.  Shingles shot (for age 50 and " up).  General health tests  Diabetes screening:  Starting at age 35, Get screened for diabetes at least every 3 years.  If you are younger than age 35, ask your care team if you should be screened for diabetes.  Cholesterol test: At age 39, start having a cholesterol test every 5 years, or more often if advised.  Bone density scan (DEXA): At age 50, ask your care team if you should have this scan for osteoporosis (brittle bones).  Hepatitis C: Get tested at least once in your life.  Abdominal aortic aneurysm screening: Talk to your doctor about having this screening if you:  Have ever smoked; and  Are biologically male; and  Are between the ages of 65 and 75.  STIs (sexually transmitted infections)  Before age 24: Ask your care team if you should be screened for STIs.  After age 24: Get screened for STIs if you're at risk. You are at risk for STIs (including HIV) if:  You are sexually active with more than one person.  You don't use condoms every time.  You or a partner was diagnosed with a sexually transmitted infection.  If you are at risk for HIV, ask about PrEP medicine to prevent HIV.  Get tested for HIV at least once in your life, whether you are at risk for HIV or not.  Cancer screening tests  Cervical cancer screening: If you have a cervix, begin getting regular cervical cancer screening tests at age 21. Most people who have regular screenings with normal results can stop after age 65. Talk about this with your provider.  Breast cancer scan (mammogram): If you've ever had breasts, begin having regular mammograms starting at age 40. This is a scan to check for breast cancer.  Colon cancer screening: It is important to start screening for colon cancer at age 45.  Have a colonoscopy test every 10 years (or more often if you're at risk) Or, ask your provider about stool tests like a FIT test every year or Cologuard test every 3 years.  To learn more about your testing options, visit:  "www.[employer]/384881.pdf.  For help making a decision, visit: dg.ayo/ju62075.  Prostate cancer screening test: If you have a prostate and are age 55 to 69, ask your provider if you would benefit from a yearly prostate cancer screening test.  Lung cancer screening: If you are a current or former smoker age 50 to 80, ask your care team if ongoing lung cancer screenings are right for you.    For informational purposes only. Not to replace the advice of your health care provider. Copyright   2023 Health system. All rights reserved. Clinically reviewed by the [facility]. [employer] 849166 - REV 6/25.  9 Ways to Cut Back on Drinking  Maybe you've found yourself drinking more alcohol than you'd prefer. If you want to cut back, here are some ideas to try.    Think before you drink.  Do you really want a drink, or is it just a habit? If you're used to having a drink at a certain time, try doing something else then.     Look for substitutes.  Find some no-alcohol drinks that you enjoy, like flavored seltzer water, tea with honey, or tonic with a slice of lime. Or try alcohol-free beer or \"virgin\" cocktails (without the alcohol).     Drink more water.  Use water to quench your thirst. Drink a glass of water before you have any alcohol. Have another glass along with every drink or between drinks.     Shrink your drink.  For example, have a bottle of beer instead of a pint. Use a smaller glass for wine. Choose drinks with lower alcohol content (ABV%). Or use less liquor and more mixer in cocktails.     Slow down.  It's easy to drink quickly and without thinking about it. Pay attention, and make each drink last longer.     Do the math.  Total up how much you spend on alcohol each month. How much is that a year? If you cut back, what could you do with the money you save?     Take a break.  Choose a day or two each week when you won't drink at all. Notice how you feel on those days, " "physically and emotionally. How did you sleep? Do you feel better? Over time, add more break days.     Count calories.  Would you like to lose some weight? For some people that's a good motivator for cutting back. Figure out how many calories are in each drink. How many does that add up to in a day? In a week? In a month?     Practice saying no.  Be ready when someone offers you a drink. Try: \"Thanks, I've had enough.\" Or \"Thanks, but I'm cutting back.\" Or \"No, thanks. I feel better when I drink less.\"   Current as of: August 20, 2024  Content Version: 14.5 2024-2025 [employer].   Care instructions adapted under license by your healthcare professional. If you have questions about a medical condition or this instruction, always ask your healthcare professional. [employer] disclaims any warranty or liability for your use of this information.  Substance Use Disorder: Care Instructions  Overview     You can improve your life and health by stopping your use of alcohol or drugs. When you don't drink or use drugs, you may feel and sleep better. You may get along better with your family, friends, and coworkers. There are medicines and programs that can help with substance use disorder.  How can you care for yourself at home?  Here are some ways to help you stay sober and prevent relapse.  If you have been given medicine to help keep you sober or reduce your cravings, be sure to take it exactly as prescribed.  Talk to your doctor about programs that can help you stop using drugs or drinking alcohol.  Do not keep alcohol or drugs in your home.  Plan ahead. Think about what you'll say if other people ask you to drink or use drugs. Try not to spend time with people who drink or use drugs.  Use the time and money spent on drinking or drugs to do something that's important to you.  Preventing a relapse  Have a plan to deal with relapse. Learn to recognize changes in your thinking that lead you to drink " or use drugs. Get help before you start to drink or use drugs again.  Try to stay away from situations, friends, or places that may lead you to drink or use drugs.  If you feel the need to drink alcohol or use drugs again, seek help right away. Call a trusted friend or family member. Some people get support from organizations such as Narcotics Anonymous or [employer] or from treatment facilities.  If you relapse, get help as soon as you can. Some people make a plan with another person that outlines what they want that person to do for them if they relapse. The plan usually includes how to handle the relapse and who to notify in case of relapse.  Don't give up. Remember that a relapse doesn't mean that you have failed. Use the experience to learn the triggers that lead you to drink or use drugs. Then quit again. Recovery is a lifelong process. Many people have several relapses before they are able to quit for good.  Follow-up care is a key part of your treatment and safety. Be sure to make and go to all appointments, and call your doctor if you are having problems. It's also a good idea to know your test results and keep a list of the medicines you take.  When should you call for help?   Call 911  anytime you think you may need emergency care. For example, call if you or someone else:    Has overdosed or has withdrawal signs. Be sure to tell the emergency workers that you are or someone else is using or trying to quit using drugs. Overdose or withdrawal signs may include:  Losing consciousness.  Seizure.  Seeing or hearing things that aren't there (hallucinations).     Is thinking or talking about suicide or harming others.   Where to get help 24 hours a day, 7 days a week   If you or someone you know talks about suicide, self-harm, a mental health crisis, a substance use crisis, or any other kind of emotional distress, get help right away. You can:    Call the Suicide and Crisis Lifeline at 988.     Call  "5-998-732-TALK (1-843.715.4950).     Text HOME to 983084 to access the Crisis Text Line.   Consider saving these numbers in your phone.  Go to [employer] for more information or to chat online.  Call your doctor now or seek immediate medical care if:    You are having withdrawal symptoms. These may include nausea or vomiting, sweating, shakiness, and anxiety.   Watch closely for changes in your health, and be sure to contact your doctor if:    You have a relapse.     You need more help or support to stop.   Where can you learn more?  Go to https://www.[employer].net/patiented  Enter H573 in the search box to learn more about \"Substance Use Disorder: Care Instructions.\"  Current as of: August 20, 2024  Content Version: 14.5    8738-5188 [employer].   Care instructions adapted under license by your healthcare professional. If you have questions about a medical condition or this instruction, always ask your healthcare professional. [employer] disclaims any warranty or liability for your use of this information.       "

## 2025-08-03 ENCOUNTER — MYC MEDICAL ADVICE (OUTPATIENT)
Dept: FAMILY MEDICINE | Facility: CLINIC | Age: 35
End: 2025-08-03
Payer: COMMERCIAL

## 2025-08-04 DIAGNOSIS — R42 DIZZINESS: Primary | ICD-10-CM

## 2025-08-05 ENCOUNTER — PATIENT OUTREACH (OUTPATIENT)
Dept: CARE COORDINATION | Facility: CLINIC | Age: 35
End: 2025-08-05
Payer: COMMERCIAL